# Patient Record
Sex: FEMALE | Race: WHITE | NOT HISPANIC OR LATINO | URBAN - METROPOLITAN AREA
[De-identification: names, ages, dates, MRNs, and addresses within clinical notes are randomized per-mention and may not be internally consistent; named-entity substitution may affect disease eponyms.]

---

## 2017-01-05 ENCOUNTER — INPATIENT (INPATIENT)
Facility: HOSPITAL | Age: 82
LOS: 0 days | Discharge: HOME | DRG: 247 | End: 2017-01-06
Attending: INTERNAL MEDICINE | Admitting: INTERNAL MEDICINE
Payer: COMMERCIAL

## 2017-01-05 VITALS
OXYGEN SATURATION: 95 % | TEMPERATURE: 98 F | RESPIRATION RATE: 18 BRPM | HEART RATE: 58 BPM | SYSTOLIC BLOOD PRESSURE: 178 MMHG | DIASTOLIC BLOOD PRESSURE: 74 MMHG

## 2017-01-05 DIAGNOSIS — Z95.9 PRESENCE OF CARDIAC AND VASCULAR IMPLANT AND GRAFT, UNSPECIFIED: Chronic | ICD-10-CM

## 2017-01-05 DIAGNOSIS — Z98.890 OTHER SPECIFIED POSTPROCEDURAL STATES: Chronic | ICD-10-CM

## 2017-01-05 DIAGNOSIS — R94.39 ABNORMAL RESULT OF OTHER CARDIOVASCULAR FUNCTION STUDY: ICD-10-CM

## 2017-01-05 LAB
BLD GP AB SCN SERPL QL: SIGNIFICANT CHANGE UP
TYPE + AB SCN PNL BLD: SIGNIFICANT CHANGE UP

## 2017-01-05 RX ORDER — CITALOPRAM 10 MG/1
10 TABLET, FILM COATED ORAL DAILY
Qty: 0 | Refills: 0 | Status: DISCONTINUED | OUTPATIENT
Start: 2017-01-05 | End: 2017-01-06

## 2017-01-05 RX ORDER — ASPIRIN/CALCIUM CARB/MAGNESIUM 324 MG
81 TABLET ORAL DAILY
Qty: 0 | Refills: 0 | Status: DISCONTINUED | OUTPATIENT
Start: 2017-01-05 | End: 2017-01-06

## 2017-01-05 RX ORDER — METOPROLOL TARTRATE 50 MG
100 TABLET ORAL DAILY
Qty: 0 | Refills: 0 | Status: DISCONTINUED | OUTPATIENT
Start: 2017-01-05 | End: 2017-01-06

## 2017-01-05 RX ORDER — PANTOPRAZOLE SODIUM 20 MG/1
1 TABLET, DELAYED RELEASE ORAL
Qty: 30 | Refills: 0 | OUTPATIENT
Start: 2017-01-05 | End: 2017-02-04

## 2017-01-05 RX ORDER — SODIUM CHLORIDE 0.65 %
1 AEROSOL, SPRAY (ML) NASAL
Qty: 0 | Refills: 0 | Status: DISCONTINUED | OUTPATIENT
Start: 2017-01-05 | End: 2017-01-06

## 2017-01-05 RX ORDER — LEVOTHYROXINE SODIUM 125 MCG
75 TABLET ORAL DAILY
Qty: 0 | Refills: 0 | Status: DISCONTINUED | OUTPATIENT
Start: 2017-01-05 | End: 2017-01-06

## 2017-01-05 RX ORDER — DRONEDARONE 400 MG/1
400 TABLET, FILM COATED ORAL
Qty: 0 | Refills: 0 | Status: DISCONTINUED | OUTPATIENT
Start: 2017-01-05 | End: 2017-01-06

## 2017-01-05 RX ORDER — SODIUM CHLORIDE 9 MG/ML
1000 INJECTION INTRAMUSCULAR; INTRAVENOUS; SUBCUTANEOUS
Qty: 0 | Refills: 0 | Status: DISCONTINUED | OUTPATIENT
Start: 2017-01-05 | End: 2017-01-06

## 2017-01-05 RX ORDER — DABIGATRAN ETEXILATE MESYLATE 150 MG/1
75 CAPSULE ORAL EVERY 12 HOURS
Qty: 0 | Refills: 0 | Status: DISCONTINUED | OUTPATIENT
Start: 2017-01-06 | End: 2017-01-06

## 2017-01-05 RX ORDER — ATORVASTATIN CALCIUM 80 MG/1
40 TABLET, FILM COATED ORAL AT BEDTIME
Qty: 0 | Refills: 0 | Status: DISCONTINUED | OUTPATIENT
Start: 2017-01-05 | End: 2017-01-06

## 2017-01-05 RX ORDER — CLOPIDOGREL BISULFATE 75 MG/1
75 TABLET, FILM COATED ORAL DAILY
Qty: 0 | Refills: 0 | Status: DISCONTINUED | OUTPATIENT
Start: 2017-01-05 | End: 2017-01-06

## 2017-01-05 RX ORDER — ASPIRIN/CALCIUM CARB/MAGNESIUM 324 MG
325 TABLET ORAL ONCE
Qty: 0 | Refills: 0 | Status: COMPLETED | OUTPATIENT
Start: 2017-01-05 | End: 2017-01-05

## 2017-01-05 RX ORDER — DABIGATRAN ETEXILATE MESYLATE 150 MG/1
75 CAPSULE ORAL EVERY 12 HOURS
Qty: 0 | Refills: 0 | Status: DISCONTINUED | OUTPATIENT
Start: 2017-01-05 | End: 2017-01-05

## 2017-01-05 RX ORDER — DABIGATRAN ETEXILATE MESYLATE 150 MG/1
150 CAPSULE ORAL EVERY 12 HOURS
Qty: 0 | Refills: 0 | Status: DISCONTINUED | OUTPATIENT
Start: 2017-01-05 | End: 2017-01-05

## 2017-01-05 RX ORDER — PANTOPRAZOLE SODIUM 20 MG/1
40 TABLET, DELAYED RELEASE ORAL
Qty: 0 | Refills: 0 | Status: DISCONTINUED | OUTPATIENT
Start: 2017-01-05 | End: 2017-01-06

## 2017-01-05 RX ORDER — LOSARTAN POTASSIUM 100 MG/1
50 TABLET, FILM COATED ORAL DAILY
Qty: 0 | Refills: 0 | Status: DISCONTINUED | OUTPATIENT
Start: 2017-01-05 | End: 2017-01-06

## 2017-01-05 RX ADMIN — ATORVASTATIN CALCIUM 40 MILLIGRAM(S): 80 TABLET, FILM COATED ORAL at 21:47

## 2017-01-05 RX ADMIN — SODIUM CHLORIDE 75 MILLILITER(S): 9 INJECTION INTRAMUSCULAR; INTRAVENOUS; SUBCUTANEOUS at 17:31

## 2017-01-05 RX ADMIN — SODIUM CHLORIDE 75 MILLILITER(S): 9 INJECTION INTRAMUSCULAR; INTRAVENOUS; SUBCUTANEOUS at 13:13

## 2017-01-05 RX ADMIN — Medication 325 MILLIGRAM(S): at 13:13

## 2017-01-05 RX ADMIN — DRONEDARONE 400 MILLIGRAM(S): 400 TABLET, FILM COATED ORAL at 17:34

## 2017-01-05 RX ADMIN — SODIUM CHLORIDE 75 MILLILITER(S): 9 INJECTION INTRAMUSCULAR; INTRAVENOUS; SUBCUTANEOUS at 17:14

## 2017-01-05 NOTE — PROGRESS NOTE ADULT - SUBJECTIVE AND OBJECTIVE BOX
Subjective:  81y  Female who had a left heart catheterization which showed:       LM: Normal       LAD: treated with laser atherectomy and 2 Resolute BRENDA's       LCX: Normal       RCA: Normal    PAST MEDICAL & SURGICAL HISTORY:  Stented coronary artery: 20090a d 2015  Abnormal stress test  Carotid artery disease  PAD (peripheral artery disease)  COPD (chronic obstructive pulmonary disease)  Atrial fibrillation, chronic  CAD, multiple vessel  Hyperlipidemia  Hypertension  S/P angioplasty with stent: 2009, 2015  H/O shoulder surgery: right shoulder  2015    FAMILY HISTORY:    MEDICATIONS  (STANDING):  sodium chloride 0.9%. 1000milliLiter(s) IV Continuous <Continuous>  dronedarone 400milliGRAM(s) Oral two times a day  citalopram 10milliGRAM(s) Oral daily  atorvastatin 40milliGRAM(s) Oral at bedtime  losartan 50milliGRAM(s) Oral daily  hydrochlorothiazide    Capsule 12.5milliGRAM(s) Oral daily  clopidogrel Tablet 75milliGRAM(s) Oral daily  metoprolol succinate ER 100milliGRAM(s) Oral daily  levothyroxine 75MICROGram(s) Oral daily  dabigatran 75milliGRAM(s) Oral every 12 hours  aspirin enteric coated 81milliGRAM(s) Oral daily    MEDICATIONS  (PRN):    Patient is a 81y old  Female who presents with a chief complaint of   HEALTH ISSUES - PROBLEM Dx:        HPI:    General: No fatigue, no fevers/chills  Respiratory: No dyspnea, no cough, no wheeze  CV: No chest pain, no palpitations  Abd: No nausea  Neuro: No headache, no dizziness  No Known Allergies      Objective:  Vital Signs Last 24 Hrs  T(C): 36.7, Max: 36.7 (01-05 @ 12:30)  T(F): 98.1, Max: 98.1 (01-05 @ 12:30)  HR: 55 (54 - 58)  BP: 164/72 (158/70 - 183/79)  RR: 18 (18 - 18)  SpO2: 98% (94% - 98%)  CM: SR  Neuro: A&OX3, CN 2-12 intact  HEENT: NC, AT  Lungs: CTA B/L  CV: S1, S2, no murmur, RRR  Abd: Soft  Right Groin: Soft, no bleeding, no hematoma  Extremity: + distal pulses  EKG: SB Subjective:  81y  Female who had a left heart catheterization which showed:       LM: Mild luminal irregularities with no flow limiting disturbances.       LAD: 85% proximal ISR (3.6 sq. mm by IVUS) treated with laser atherectomy and 2 Resolute BRENDA's       LCX: 10% proximal and distal ISR       RCA: Mild luminal irregularities with no flow limiting disturbances.    PAST MEDICAL & SURGICAL HISTORY:  Stented coronary artery: 20090a d 2015  Abnormal stress test  Carotid artery disease  PAD (peripheral artery disease)  COPD (chronic obstructive pulmonary disease)  Atrial fibrillation, chronic  CAD, multiple vessel  Hyperlipidemia  Hypertension  S/P angioplasty with stent: 2009, 2015  H/O shoulder surgery: right shoulder  2015    FAMILY HISTORY:    MEDICATIONS  (STANDING):  sodium chloride 0.9%. 1000milliLiter(s) IV Continuous <Continuous>  dronedarone 400milliGRAM(s) Oral two times a day  citalopram 10milliGRAM(s) Oral daily  atorvastatin 40milliGRAM(s) Oral at bedtime  losartan 50milliGRAM(s) Oral daily  hydrochlorothiazide    Capsule 12.5milliGRAM(s) Oral daily  clopidogrel Tablet 75milliGRAM(s) Oral daily  metoprolol succinate ER 100milliGRAM(s) Oral daily  levothyroxine 75MICROGram(s) Oral daily  dabigatran 75milliGRAM(s) Oral every 12 hours  aspirin enteric coated 81milliGRAM(s) Oral daily    MEDICATIONS  (PRN):    Patient is a 81y old  Female who presents with a chief complaint of   HEALTH ISSUES - PROBLEM Dx:        HPI:    General: No fatigue, no fevers/chills  Respiratory: No dyspnea, no cough, no wheeze  CV: No chest pain, no palpitations  Abd: No nausea  Neuro: No headache, no dizziness  No Known Allergies      Objective:  Vital Signs Last 24 Hrs  T(C): 36.7, Max: 36.7 (01-05 @ 12:30)  T(F): 98.1, Max: 98.1 (01-05 @ 12:30)  HR: 55 (54 - 58)  BP: 164/72 (158/70 - 183/79)  RR: 18 (18 - 18)  SpO2: 98% (94% - 98%)  CM: SR  Neuro: A&OX3, CN 2-12 intact  HEENT: NC, AT  Lungs: CTA B/L  CV: S1, S2, no murmur, RRR  Abd: Soft  Right Groin: Soft, no bleeding, no hematoma  Extremity: + distal pulses  EKG: SB

## 2017-01-05 NOTE — PROGRESS NOTE ADULT - ASSESSMENT
81y Female  Procedure: Left heart catheterization and PCI with BRENDA of the pLAD  Pre-op diagnosis: CCS class 2 angina  Post-op diagnosis: CAD of native LAD of native heart with stable angina    1. Remove right femoral artery sheath at:     2. Bedrest for:     3. Admit to:     4. Follow up as an outpatient with     5. DAPT: Plavix 75mg daily and Aspirin 81mg daily    6. Statin:     7. CBC, BMP, Mg, EKG in AM    8. Continue current medications

## 2017-01-06 VITALS
DIASTOLIC BLOOD PRESSURE: 63 MMHG | TEMPERATURE: 99 F | OXYGEN SATURATION: 95 % | SYSTOLIC BLOOD PRESSURE: 116 MMHG | RESPIRATION RATE: 16 BRPM | HEART RATE: 104 BPM

## 2017-01-06 LAB
ANION GAP SERPL CALC-SCNC: 13 MMOL/L — SIGNIFICANT CHANGE UP (ref 5–17)
ANISOCYTOSIS BLD QL: SLIGHT — SIGNIFICANT CHANGE UP
BUN SERPL-MCNC: 14 MG/DL — SIGNIFICANT CHANGE UP (ref 8–20)
CALCIUM SERPL-MCNC: 9.9 MG/DL — SIGNIFICANT CHANGE UP (ref 8.6–10.2)
CHLORIDE SERPL-SCNC: 97 MMOL/L — LOW (ref 98–107)
CHOLEST SERPL-MCNC: 157 MG/DL — SIGNIFICANT CHANGE UP (ref 110–199)
CO2 SERPL-SCNC: 27 MMOL/L — SIGNIFICANT CHANGE UP (ref 22–29)
CREAT SERPL-MCNC: 0.75 MG/DL — SIGNIFICANT CHANGE UP (ref 0.5–1.3)
EOSINOPHIL NFR BLD AUTO: 1 % — SIGNIFICANT CHANGE UP (ref 0–5)
GLUCOSE SERPL-MCNC: 113 MG/DL — SIGNIFICANT CHANGE UP (ref 70–115)
HCT VFR BLD CALC: 41.2 % — SIGNIFICANT CHANGE UP (ref 37–47)
HDLC SERPL-MCNC: 64 MG/DL — LOW
HGB BLD-MCNC: 13.5 G/DL — SIGNIFICANT CHANGE UP (ref 12–16)
LIPID PNL WITH DIRECT LDL SERPL: 72 MG/DL — SIGNIFICANT CHANGE UP
LYMPHOCYTES # BLD AUTO: 28 % — SIGNIFICANT CHANGE UP (ref 20–55)
MAGNESIUM SERPL-MCNC: 1.9 MG/DL — SIGNIFICANT CHANGE UP (ref 1.8–2.5)
MCHC RBC-ENTMCNC: 29.8 PG — SIGNIFICANT CHANGE UP (ref 27–31)
MCHC RBC-ENTMCNC: 32.8 G/DL — SIGNIFICANT CHANGE UP (ref 32–36)
MCV RBC AUTO: 90.9 FL — SIGNIFICANT CHANGE UP (ref 81–99)
MONOCYTES NFR BLD AUTO: 15 % — HIGH (ref 3–10)
NEUTROPHILS NFR BLD AUTO: 54 % — SIGNIFICANT CHANGE UP (ref 37–73)
OVALOCYTES BLD QL SMEAR: SLIGHT — SIGNIFICANT CHANGE UP
PLAT MORPH BLD: NORMAL — SIGNIFICANT CHANGE UP
PLATELET # BLD AUTO: 180 K/UL — SIGNIFICANT CHANGE UP (ref 150–400)
POIKILOCYTOSIS BLD QL AUTO: SLIGHT — SIGNIFICANT CHANGE UP
POTASSIUM SERPL-MCNC: 4.5 MMOL/L — SIGNIFICANT CHANGE UP (ref 3.5–5.3)
POTASSIUM SERPL-SCNC: 4.5 MMOL/L — SIGNIFICANT CHANGE UP (ref 3.5–5.3)
RBC # BLD: 4.53 M/UL — SIGNIFICANT CHANGE UP (ref 4.4–5.2)
RBC # FLD: 13.5 % — SIGNIFICANT CHANGE UP (ref 11–15.6)
RBC BLD AUTO: SIGNIFICANT CHANGE UP
SODIUM SERPL-SCNC: 137 MMOL/L — SIGNIFICANT CHANGE UP (ref 135–145)
TOTAL CHOLESTEROL/HDL RATIO MEASUREMENT: 2 RATIO — LOW (ref 3.3–7.1)
TRIGL SERPL-MCNC: 107 MG/DL — SIGNIFICANT CHANGE UP (ref 10–200)
VARIANT LYMPHS # BLD: 2 % — SIGNIFICANT CHANGE UP (ref 0–6)
WBC # BLD: 9.44 K/UL — SIGNIFICANT CHANGE UP (ref 4.8–10.8)
WBC # FLD AUTO: 9.44 K/UL — SIGNIFICANT CHANGE UP (ref 4.8–10.8)

## 2017-01-06 PROCEDURE — 36415 COLL VENOUS BLD VENIPUNCTURE: CPT

## 2017-01-06 PROCEDURE — 86901 BLOOD TYPING SEROLOGIC RH(D): CPT

## 2017-01-06 PROCEDURE — 86850 RBC ANTIBODY SCREEN: CPT

## 2017-01-06 PROCEDURE — 80048 BASIC METABOLIC PNL TOTAL CA: CPT

## 2017-01-06 PROCEDURE — C1885: CPT

## 2017-01-06 PROCEDURE — 93458 L HRT ARTERY/VENTRICLE ANGIO: CPT | Mod: XU

## 2017-01-06 PROCEDURE — C1887: CPT

## 2017-01-06 PROCEDURE — C1753: CPT

## 2017-01-06 PROCEDURE — C1874: CPT

## 2017-01-06 PROCEDURE — 92978 ENDOLUMINL IVUS OCT C 1ST: CPT | Mod: LD

## 2017-01-06 PROCEDURE — C1725: CPT

## 2017-01-06 PROCEDURE — C1760: CPT

## 2017-01-06 PROCEDURE — 85027 COMPLETE CBC AUTOMATED: CPT

## 2017-01-06 PROCEDURE — 93005 ELECTROCARDIOGRAM TRACING: CPT

## 2017-01-06 PROCEDURE — 86900 BLOOD TYPING SEROLOGIC ABO: CPT

## 2017-01-06 PROCEDURE — C1894: CPT

## 2017-01-06 PROCEDURE — 93010 ELECTROCARDIOGRAM REPORT: CPT

## 2017-01-06 PROCEDURE — C1769: CPT

## 2017-01-06 PROCEDURE — 83735 ASSAY OF MAGNESIUM: CPT

## 2017-01-06 PROCEDURE — C9602: CPT | Mod: LD

## 2017-01-06 PROCEDURE — 80061 LIPID PANEL: CPT

## 2017-01-06 RX ORDER — LOSARTAN POTASSIUM 100 MG/1
1 TABLET, FILM COATED ORAL
Qty: 0 | Refills: 0 | DISCHARGE
Start: 2017-01-06

## 2017-01-06 RX ORDER — ASPIRIN/CALCIUM CARB/MAGNESIUM 324 MG
1 TABLET ORAL
Qty: 90 | Refills: 0
Start: 2017-01-06

## 2017-01-06 RX ORDER — CLOPIDOGREL BISULFATE 75 MG/1
1 TABLET, FILM COATED ORAL
Qty: 90 | Refills: 0
Start: 2017-01-06

## 2017-01-06 RX ORDER — ATORVASTATIN CALCIUM 80 MG/1
1 TABLET, FILM COATED ORAL
Qty: 90 | Refills: 0
Start: 2017-01-06

## 2017-01-06 RX ORDER — PANTOPRAZOLE SODIUM 20 MG/1
1 TABLET, DELAYED RELEASE ORAL
Qty: 30 | Refills: 0
Start: 2017-01-06 | End: 2017-02-05

## 2017-01-06 RX ADMIN — DRONEDARONE 400 MILLIGRAM(S): 400 TABLET, FILM COATED ORAL at 06:35

## 2017-01-06 RX ADMIN — Medication 75 MICROGRAM(S): at 06:35

## 2017-01-06 RX ADMIN — PANTOPRAZOLE SODIUM 40 MILLIGRAM(S): 20 TABLET, DELAYED RELEASE ORAL at 06:36

## 2017-01-06 RX ADMIN — CLOPIDOGREL BISULFATE 75 MILLIGRAM(S): 75 TABLET, FILM COATED ORAL at 11:32

## 2017-01-06 RX ADMIN — Medication 81 MILLIGRAM(S): at 11:33

## 2017-01-06 RX ADMIN — CITALOPRAM 10 MILLIGRAM(S): 10 TABLET, FILM COATED ORAL at 11:33

## 2017-01-06 RX ADMIN — DABIGATRAN ETEXILATE MESYLATE 75 MILLIGRAM(S): 150 CAPSULE ORAL at 06:35

## 2017-01-06 RX ADMIN — LOSARTAN POTASSIUM 50 MILLIGRAM(S): 100 TABLET, FILM COATED ORAL at 06:35

## 2017-01-06 RX ADMIN — Medication 100 MILLIGRAM(S): at 06:35

## 2017-01-06 NOTE — DISCHARGE NOTE ADULT - CARE PROVIDER_API CALL
Alfonso Herrera), Cardiovascular Disease; Interventional Cardiology  [address] [place] [address]  Phone: [phone]  Fax: [phone]

## 2017-01-06 NOTE — DISCHARGE NOTE ADULT - CARE PLAN
Principal Discharge DX:	CAD, multiple vessel  Goal:	maintain optimum heart function  Instructions for follow-up, activity and diet:	as tolerated

## 2017-01-06 NOTE — DISCHARGE NOTE ADULT - MEDICATION SUMMARY - MEDICATIONS TO TAKE
I will START or STAY ON the medications listed below when I get home from the hospital:    aspirin 81 mg oral delayed release tablet  -- 1 tab(s) by mouth once a day  -- Indication: For cad    losartan 50 mg oral tablet  -- 1 tab(s) by mouth once a day  -- Indication: For htn    Multaq 400 mg oral tablet  -- 1 tab(s) by mouth 2 times a day  -- Indication: For anti arrythmic    Pradaxa 75 mg oral capsule  -- 1 cap(s) by mouth 2 times a day  -- Indication: For cad    citalopram 10 mg oral tablet  -- 1 tab(s) by mouth once a day  -- Indication: For depression    atorvastatin 40 mg oral tablet  -- 1 tab(s) by mouth once a day (at bedtime)  -- Indication: For hyperlipidemia    hydrochlorothiazide-losartan 12.5 mg-50 mg oral tablet  -- 1 tab(s) by mouth once a day  -- Indication: For htn    clopidogrel 75 mg oral tablet  -- 1 tab(s) by mouth once a day  -- Indication: For cad    Metoprolol Succinate  mg oral tablet, extended release  -- 1 tab(s) by mouth once a day  -- Indication: For htn    hydrochlorothiazide 25 mg oral tablet  -- 1 tab(s) by mouth once a day  -- Indication: For htn    pantoprazole 40 mg oral delayed release tablet  -- 1 tab(s) by mouth once a day (before a meal)  -- Indication: For gerd    levothyroxine 75 mcg (0.075 mg) oral capsule  -- 1 cap(s) by mouth once a day  -- Indication: For hypothyroidism

## 2017-01-06 NOTE — DISCHARGE NOTE ADULT - MEDICATION SUMMARY - MEDICATIONS TO STOP TAKING
I will STOP taking the medications listed below when I get home from the hospital:    Crestor 10 mg oral tablet  -- 1 tab(s) by mouth once a day (at bedtime)

## 2017-01-06 NOTE — DISCHARGE NOTE ADULT - HOSPITAL COURSE
Subjective:  81y  Female who had a left heart catheterization which showed:       LM: Normal       LAD: treated with laser atherectomy and 2 Resolute BRENDA's       LCX: Normal       RCA: Normal    PAST MEDICAL & SURGICAL HISTORY:  Stented coronary artery: 20090a d 2015  Abnormal stress test  Carotid artery disease  PAD (peripheral artery disease)  COPD (chronic obstructive pulmonary disease)  Atrial fibrillation, chronic  CAD, multiple vessel  Hyperlipidemia  Hypertension  S/P angioplasty with stent: 2009, 2015  H/O shoulder surgery: right shoulder  2015    FAMILY HISTORY:    MEDICATIONS  (STANDING):  sodium chloride 0.9%. 1000milliLiter(s) IV Continuous <Continuous>  dronedarone 400milliGRAM(s) Oral two times a day  citalopram 10milliGRAM(s) Oral daily  atorvastatin 40milliGRAM(s) Oral at bedtime  losartan 50milliGRAM(s) Oral daily  hydrochlorothiazide    Capsule 12.5milliGRAM(s) Oral daily  clopidogrel Tablet 75milliGRAM(s) Oral daily  metoprolol succinate ER 100milliGRAM(s) Oral daily  levothyroxine 75MICROGram(s) Oral daily  dabigatran 75milliGRAM(s) Oral every 12 hours  aspirin enteric coated 81milliGRAM(s) Oral daily    MEDICATIONS  (PRN):    Patient is a 81y old  Female who presents with a chief complaint of   HEALTH ISSUES - PROBLEM Dx:        HPI:    General: No fatigue, no fevers/chills  Respiratory: No dyspnea, no cough, no wheeze  CV: No chest pain, no palpitations  Abd: No nausea  Neuro: No headache, no dizziness  No Known Allergies      Objective:  Vital Signs Last 24 Hrs  T(C): 36.7, Max: 36.7 (01-05 @ 12:30)  T(F): 98.1, Max: 98.1 (01-05 @ 12:30)  HR: 55 (54 - 58)  BP: 164/72 (158/70 - 183/79)  RR: 18 (18 - 18)  SpO2: 98% (94% - 98%)  CM: SR  Neuro: A&OX3, CN 2-12 intact  HEENT: NC, AT  Lungs: CTA B/L  CV: S1, S2, no murmur, RRR  Abd: Soft  Right Groin: Soft, no bleeding, no hematoma  Extremity: + distal pulses  EKG: SB    Assessment and Plan:   · Assessment		  81y Female  Procedure: Left heart catheterization and PCI with BRENDA of the pLAD  Pre-op diagnosis: CCS class 2 angina  Post-op diagnosis: CAD of native LAD of native heart with stable angina       Follow up as an outpatient with MD Herrera 1 week.      DAPT:  Add Plavix 75mg daily and Aspirin 81mg daily    Statin: Lipitor 40mg po qhs     Aggressive life style modifications reviewed and explained in detail.      Continue current medications including Pradaxa    D.C planning as per MD Herrera.

## 2017-01-06 NOTE — DISCHARGE NOTE ADULT - PATIENT PORTAL LINK FT
“You can access the [facility] Patient Portal, offered by [facility], by registering with the following website: [email or web address]”

## 2017-01-06 NOTE — PROGRESS NOTE ADULT - SUBJECTIVE AND OBJECTIVE BOX
[place] Complaint:  Increasing ESPINOZA.      HPI:  		  81 year old female with hx of CAD s/p PCI and stents in  and , HTN, HL, hypothyroidism, atrial fibrillation on pradaxa and COPD presents for cardiac cath due to abnormal routine stress test which shows a moderate zone of anterior ischemia.  Patient and daughter report increased fatigue and dyspnea on exertion.      Primary MD Willis  Cardiology MD Herrera      Other nonspecific abnormal cardiovascular system function study  Handoff  Stented coronary artery  Abnormal stress test  Carotid artery disease  PAD (peripheral artery disease)  COPD (chronic obstructive pulmonary disease)  Atrial fibrillation, chronic  CAD, multiple vessel  Hyperlipidemia  Hypertension  S/P angioplasty with stent  H/O shoulder surgery      REVIEW OF SYSTEMS    General: Denies fever, chills, pain, no discomfort  	  Respiratory and Thorax:  Denies cough, sob, or any discomfort  	  Cardiovascular:  Denies chest pain, palpitations or any discomfort	    Gastrointestinal:  Denies n/v/d, constipation, or any discomfort    Genitourinary:  Denies frequency, burning, or pain    Musculoskeletal:  Denies joint pain, swelling, or any discomfort     Neurological:  Denies headache, dizziness blurred vision, numbing or tingling    Psychiatric:  Denies sadness or depression    Hematology  Cl bleeding o swelling    Allergic/Immunologic:	  MEDICATIONS:  dronedarone 400milliGRAM(s) Oral two times a day  losartan 50milliGRAM(s) Oral daily  hydrochlorothiazide    Capsule 12.5milliGRAM(s) Oral daily  metoprolol succinate ER 100milliGRAM(s) Oral daily  diltiazem IVPB 10milliGRAM(s) IV Intermittent once  citalopram 10milliGRAM(s) Oral daily  pantoprazole    Tablet 40milliGRAM(s) Oral before breakfast  atorvastatin 40milliGRAM(s) Oral at bedtime  levothyroxine 75MICROGram(s) Oral daily  dabigatran 75milliGRAM(s) Oral every 12 hours  clopidogrel Tablet 75milliGRAM(s) Oral daily  aspirin enteric coated 81milliGRAM(s) Oral daily  sodium chloride 0.65% Nasal 1Spray(s) Both Nostrils every 2 hours PRN        PHYSICAL EXAM:    T(C): 37.4, Max: 37.4 (01-06 @ 07:49)  HR: 141 (53 - 141)  BP: 135/79 (120/70 - 183/79)  RR: 18 (16 - 18)  SpO2: 94% (94% - 99%)    Appearance: Normal, 	  HEENT:   Normal oral mucosa, PERRL, EOMI	  Lymphatic: No lymphadenopathy  Cardiovascular: Normal S1 S2, No JVD, No murmurs, No edema  Respiratory: Lungs clear to auscultation	  Psychiatry: A & O x 3, Mood & affect appropriate  Gastrointestinal:  Soft, Non-tender, + BS	  Skin: No rashes, No ecchymoses, No cyanosis  Neurologic: Non-focal deficits noted.    Extremities: Normal range of motion, No clubbing, cyanosis or edema  Vascular: Peripheral pulses palpable 2+ bilaterally  Procedure Site:      TELEMETRY: 	    EC.5   9.44  )-----------( 180      ( 2017 05:04 )             41.2     2017 05:04    137    |  97     |  14.0   ----------------------------<  113    4.5     |  27.0   |  0.75     Ca    9.9        2017 05:04  Mg     1.9       2017 05:04 [place] Complaint:  Increasing ESPINOZA.      HPI:  		  81 year old female with hx of CAD s/p PCI and stents in  and , HTN, HL, hypothyroidism, atrial fibrillation on pradaxa and COPD presents for cardiac cath due to abnormal routine stress test which shows a moderate zone of anterior ischemia.  Patient and daughter report increased fatigue and dyspnea on exertion.  Underwent cardiac cath yesterday.    · Subjective and Objective: 	  Subjective:  81y  Female who had a left heart catheterization which showed:       LM: Mild luminal irregularities with no flow limiting disturbances.       LAD: 85% proximal ISR (3.6 sq. mm by IVUS) treated with laser atherectomy and 2 Resolute BRENDA's       LCX: 10% proximal and distal ISR       RCA: Mild luminal irregularities with no flow limiting disturbances.       Primary MD Willis  Cardiology MD Herrera      Other nonspecific abnormal cardiovascular system function study  Handoff  Stented coronary artery  Abnormal stress test  Carotid artery disease  PAD (peripheral artery disease)  COPD (chronic obstructive pulmonary disease)  Atrial fibrillation, chronic  CAD, multiple vessel  Hyperlipidemia  Hypertension  S/P angioplasty with stent  H/O shoulder surgery      REVIEW OF SYSTEMS    General: Denies fever, chills, pain, no discomfort  	  Respiratory and Thorax:  Denies cough, sob, or any discomfort  	  Cardiovascular:  Denies chest pain, palpitations or any discomfort	    Gastrointestinal:  Denies n/v/d, constipation, or any discomfort    Genitourinary:  Denies frequency, burning, or pain    Musculoskeletal:  Denies joint pain, swelling, or any discomfort     Neurological:  Denies headache, dizziness blurred vision, numbing or tingling    Hematology  Cl bleeding o swelling    Allergic/Immunologic:	  MEDICATIONS:  dronedarone 400milliGRAM(s) Oral two times a day  losartan 50milliGRAM(s) Oral daily  hydrochlorothiazide    Capsule 12.5milliGRAM(s) Oral daily  metoprolol succinate ER 100milliGRAM(s) Oral daily  diltiazem IVPB 10milliGRAM(s) IV Intermittent once  citalopram 10milliGRAM(s) Oral daily  pantoprazole    Tablet 40milliGRAM(s) Oral before breakfast  atorvastatin 40milliGRAM(s) Oral at bedtime  levothyroxine 75MICROGram(s) Oral daily  dabigatran 75milliGRAM(s) Oral every 12 hours  clopidogrel Tablet 75milliGRAM(s) Oral daily  aspirin enteric coated 81milliGRAM(s) Oral daily  sodium chloride 0.65% Nasal 1Spray(s) Both Nostrils every 2 hours PRN        PHYSICAL EXAM:    T(C): 37.4, Max: 37.4 (-06 @ 07:49)  HR: 141 (53 - 141), given cardizem and rate decreased to 100's, spoke with MD Herrera and plan to discharge after lunch.    BP: 135/79 (120/70 - 183/79)  RR: 18 (16 - 18)  SpO2: 94% (94% - 99%)    Appearance: Normal, 	  HEENT:   Normal oral mucosa, PERRL, EOMI	  Lymphatic: No lymphadenopathy  Cardiovascular: Normal S1 S2, No JVD, No murmurs, No edema  Respiratory: Lungs clear to auscultation	  Psychiatry: A & O x 3, Mood & affect appropriate  Gastrointestinal:  Soft, Non-tender, + BS	  Skin: No rashes, No ecchymoses, No cyanosis  Neurologic: Non-focal deficits noted.    Extremities: Normal range of motion, No clubbing, cyanosis or edema  Vascular: Peripheral pulses palpable 2+ bilaterally  Procedure Site:  Right groin, no bleeding, no bruising no pain, no hematoma noted, +pp no edema.        TELEMETRY: 	    EC.5   9.44  )-----------( 180      ( 2017 05:04 )             41.2     2017 05:04    137    |  97     |  14.0   ----------------------------<  113    4.5     |  27.0   |  0.75     Ca    9.9        2017 05:04  Mg     1.9       2017 05:04      A/P:   S/P LHC:  BRENDA x 1 to proximal LAD    Subjective:  81y  Female who had a left heart catheterization which showed:       LM: Normal       LAD: treated with laser atherectomy and 2 Resolute BRENDA's       LCX: Normal       RCA: Normal    PAST MEDICAL & SURGICAL HISTORY:  Stented coronary artery:  d   Abnormal stress test  Carotid artery disease  PAD (peripheral artery disease)  COPD (chronic obstructive pulmonary disease)  Atrial fibrillation, chronic  CAD, multiple vessel  Hyperlipidemia  Hypertension  S/P angioplasty with stent: ,   H/O shoulder surgery: right shoulder      FAMILY HISTORY:    MEDICATIONS  (STANDING):  sodium chloride 0.9%. 1000milliLiter(s) IV Continuous <Continuous>  dronedarone 400milliGRAM(s) Oral two times a day  citalopram 10milliGRAM(s) Oral daily  atorvastatin 40milliGRAM(s) Oral at bedtime  losartan 50milliGRAM(s) Oral daily  hydrochlorothiazide    Capsule 12.5milliGRAM(s) Oral daily  clopidogrel Tablet 75milliGRAM(s) Oral daily  metoprolol succinate ER 100milliGRAM(s) Oral daily  levothyroxine 75MICROGram(s) Oral daily  dabigatran 75milliGRAM(s) Oral every 12 hours  aspirin enteric coated 81milliGRAM(s) Oral daily    MEDICATIONS  (PRN):    Patient is a 81y old  Female who presents with a chief complaint of   HEALTH ISSUES - PROBLEM Dx:        HPI:    General: No fatigue, no fevers/chills  Respiratory: No dyspnea, no cough, no wheeze  CV: No chest pain, no palpitations  Abd: No nausea  Neuro: No headache, no dizziness  No Known Allergies      Objective:  Vital Signs Last 24 Hrs  T(C): 36.7, Max: 36.7 ( @ 12:30)  T(F): 98.1, Max: 98.1 ( @ 12:30)  HR: 55 (54 - 58)  BP: 164/72 (158/70 - 183/79)  RR: 18 (18 - 18)  SpO2: 98% (94% - 98%)  CM: SR  Neuro: A&OX3, CN 2-12 intact  HEENT: NC, AT  Lungs: CTA B/L  CV: S1, S2, no murmur, RRR  Abd: Soft  Right Groin: Soft, no bleeding, no hematoma  Extremity: + distal pulses  EKG: SB    Assessment and Plan:   · Assessment		  81y Female  Procedure: Left heart catheterization and PCI with BRENDA of the pLAD  Pre-op diagnosis: CCS class 2 angina  Post-op diagnosis: CAD of native LAD of native heart with stable angina       Follow up as an outpatient with MD Herrera 1 week.      DAPT:  Add Plavix 75mg daily and Aspirin 81mg daily    Statin: Lipitor 40mg po qhs    Aggressive life style modifications reviewed and explained in detail.      Continue current medications including Pradaxa    D.C planning as per MD Herrera.

## 2017-01-06 NOTE — DISCHARGE NOTE ADULT - ADDITIONAL INSTRUCTIONS
No heavy lifting, driving, sex, tub baths, swimming, or any activity that submerges the lower half of the body in water for 48 hours.  Limited walking and stairs for 48 hours.    Change the bandaid after 24 hours and every 24 hours after that.  Keep the puncture site dry and covered with a bandaid until a scab forms.    Observe the site frequently.  If bleeding or a large lump (the size of a golf ball or bigger) occurs lie flat, apply continuous direct pressure just above the puncture site for at least 10 minutes, and notify your physician immediately.  If the bleeding cannot be controlled, call 911 immediately for assistance.  Notify your physician of pain, swelling or any drainage.    Notify your physician immediately if coldness, numbness, discoloration or pain in your foot occurs.  Restricted use with no heavy lifting of affected arm for 48 hours.  No submerging the arm in water for 48 hours.  You may start showering today.  Call your doctor for any bleeding, swelling, loss of sensation in the hand or fingers, or fingers turning blue.  If heavy bleeding or large lumps form, hold pressure at the spot and come to the Emergency Room.

## 2017-09-21 ENCOUNTER — TRANSCRIPTION ENCOUNTER (OUTPATIENT)
Age: 82
End: 2017-09-21

## 2017-12-22 ENCOUNTER — TRANSCRIPTION ENCOUNTER (OUTPATIENT)
Age: 82
End: 2017-12-22

## 2018-04-06 NOTE — DISCHARGE NOTE ADULT - NS MD DC FALL RISK RISK
For information on Fall & Injury Prevention, visit [email or web address]
None

## 2019-06-27 ENCOUNTER — OUTPATIENT (OUTPATIENT)
Dept: OUTPATIENT SERVICES | Facility: HOSPITAL | Age: 84
LOS: 1 days | End: 2019-06-27
Payer: MEDICARE

## 2019-06-27 VITALS
DIASTOLIC BLOOD PRESSURE: 67 MMHG | HEIGHT: 62 IN | RESPIRATION RATE: 18 BRPM | OXYGEN SATURATION: 92 % | SYSTOLIC BLOOD PRESSURE: 144 MMHG | HEART RATE: 62 BPM | WEIGHT: 201.94 LBS | TEMPERATURE: 98 F

## 2019-06-27 DIAGNOSIS — Z98.890 OTHER SPECIFIED POSTPROCEDURAL STATES: Chronic | ICD-10-CM

## 2019-06-27 DIAGNOSIS — R94.39 ABNORMAL RESULT OF OTHER CARDIOVASCULAR FUNCTION STUDY: ICD-10-CM

## 2019-06-27 DIAGNOSIS — Z01.810 ENCOUNTER FOR PREPROCEDURAL CARDIOVASCULAR EXAMINATION: ICD-10-CM

## 2019-06-27 DIAGNOSIS — Z95.9 PRESENCE OF CARDIAC AND VASCULAR IMPLANT AND GRAFT, UNSPECIFIED: Chronic | ICD-10-CM

## 2019-06-27 LAB
ALBUMIN SERPL ELPH-MCNC: 4.5 G/DL — SIGNIFICANT CHANGE UP (ref 3.3–5.2)
ALP SERPL-CCNC: 68 U/L — SIGNIFICANT CHANGE UP (ref 40–120)
ALT FLD-CCNC: 22 U/L — SIGNIFICANT CHANGE UP
ANION GAP SERPL CALC-SCNC: 15 MMOL/L — SIGNIFICANT CHANGE UP (ref 5–17)
APTT BLD: 38.2 SEC — HIGH (ref 27.5–36.3)
AST SERPL-CCNC: 33 U/L — HIGH
BASOPHILS # BLD AUTO: 0.03 K/UL — SIGNIFICANT CHANGE UP (ref 0–0.2)
BASOPHILS NFR BLD AUTO: 0.3 % — SIGNIFICANT CHANGE UP (ref 0–2)
BILIRUB SERPL-MCNC: 0.6 MG/DL — SIGNIFICANT CHANGE UP (ref 0.4–2)
BUN SERPL-MCNC: 20 MG/DL — SIGNIFICANT CHANGE UP (ref 8–20)
CALCIUM SERPL-MCNC: 10.3 MG/DL — HIGH (ref 8.6–10.2)
CHLORIDE SERPL-SCNC: 99 MMOL/L — SIGNIFICANT CHANGE UP (ref 98–107)
CO2 SERPL-SCNC: 24 MMOL/L — SIGNIFICANT CHANGE UP (ref 22–29)
CREAT SERPL-MCNC: 1.02 MG/DL — SIGNIFICANT CHANGE UP (ref 0.5–1.3)
EOSINOPHIL # BLD AUTO: 0.03 K/UL — SIGNIFICANT CHANGE UP (ref 0–0.5)
EOSINOPHIL NFR BLD AUTO: 0.3 % — SIGNIFICANT CHANGE UP (ref 0–6)
GLUCOSE SERPL-MCNC: 138 MG/DL — HIGH (ref 70–115)
HCT VFR BLD CALC: 45.3 % — HIGH (ref 34.5–45)
HGB BLD-MCNC: 14.9 G/DL — SIGNIFICANT CHANGE UP (ref 11.5–15.5)
IMM GRANULOCYTES NFR BLD AUTO: 0.5 % — SIGNIFICANT CHANGE UP (ref 0–1.5)
INR BLD: 1.18 RATIO — HIGH (ref 0.88–1.16)
LYMPHOCYTES # BLD AUTO: 2.76 K/UL — SIGNIFICANT CHANGE UP (ref 1–3.3)
LYMPHOCYTES # BLD AUTO: 31.5 % — SIGNIFICANT CHANGE UP (ref 13–44)
MCHC RBC-ENTMCNC: 29.6 PG — SIGNIFICANT CHANGE UP (ref 27–34)
MCHC RBC-ENTMCNC: 32.9 GM/DL — SIGNIFICANT CHANGE UP (ref 32–36)
MCV RBC AUTO: 89.9 FL — SIGNIFICANT CHANGE UP (ref 80–100)
MONOCYTES # BLD AUTO: 0.98 K/UL — HIGH (ref 0–0.9)
MONOCYTES NFR BLD AUTO: 11.2 % — SIGNIFICANT CHANGE UP (ref 2–14)
NEUTROPHILS # BLD AUTO: 4.92 K/UL — SIGNIFICANT CHANGE UP (ref 1.8–7.4)
NEUTROPHILS NFR BLD AUTO: 56.2 % — SIGNIFICANT CHANGE UP (ref 43–77)
PLATELET # BLD AUTO: 205 K/UL — SIGNIFICANT CHANGE UP (ref 150–400)
POTASSIUM SERPL-MCNC: 3.9 MMOL/L — SIGNIFICANT CHANGE UP (ref 3.5–5.3)
POTASSIUM SERPL-SCNC: 3.9 MMOL/L — SIGNIFICANT CHANGE UP (ref 3.5–5.3)
PROT SERPL-MCNC: 7.7 G/DL — SIGNIFICANT CHANGE UP (ref 6.6–8.7)
PROTHROM AB SERPL-ACNC: 13.6 SEC — HIGH (ref 10–12.9)
RBC # BLD: 5.04 M/UL — SIGNIFICANT CHANGE UP (ref 3.8–5.2)
RBC # FLD: 12.7 % — SIGNIFICANT CHANGE UP (ref 10.3–14.5)
SODIUM SERPL-SCNC: 138 MMOL/L — SIGNIFICANT CHANGE UP (ref 135–145)
WBC # BLD: 8.76 K/UL — SIGNIFICANT CHANGE UP (ref 3.8–10.5)
WBC # FLD AUTO: 8.76 K/UL — SIGNIFICANT CHANGE UP (ref 3.8–10.5)

## 2019-06-27 PROCEDURE — 93010 ELECTROCARDIOGRAM REPORT: CPT

## 2019-06-27 PROCEDURE — 80053 COMPREHEN METABOLIC PANEL: CPT

## 2019-06-27 PROCEDURE — 36415 COLL VENOUS BLD VENIPUNCTURE: CPT

## 2019-06-27 PROCEDURE — 85610 PROTHROMBIN TIME: CPT

## 2019-06-27 PROCEDURE — 85027 COMPLETE CBC AUTOMATED: CPT

## 2019-06-27 PROCEDURE — G0463: CPT

## 2019-06-27 PROCEDURE — 85730 THROMBOPLASTIN TIME PARTIAL: CPT

## 2019-06-27 PROCEDURE — 93005 ELECTROCARDIOGRAM TRACING: CPT

## 2019-06-27 NOTE — H&P PST ADULT - LAST ECHOCARDIOGRAM
4/29/19 mild to moderate MR, mild to moderate TR, moderate to severe TR, moderate to severe pulmonary hypertension

## 2019-06-27 NOTE — H&P PST ADULT - LAST STRESS TEST
6/6/19 small amount of mild to moderate ischemia involving the mid to distal anterolateral wall EF 47%

## 2019-06-27 NOTE — H&P PST ADULT - NSICDXPASTSURGICALHX_GEN_ALL_CORE_FT
PAST SURGICAL HISTORY:  H/O shoulder surgery right shoulder  2015    S/P angioplasty with stent 2009, 2015

## 2019-06-27 NOTE — H&P PST ADULT - NSICDXPASTMEDICALHX_GEN_ALL_CORE_FT
PAST MEDICAL HISTORY:  Abnormal stress test     Atrial fibrillation, chronic     CAD, multiple vessel     Carotid artery disease     COPD (chronic obstructive pulmonary disease)     Hyperlipidemia     Hypertension     PAD (peripheral artery disease)     Stented coronary artery 20090a d 2015

## 2019-06-27 NOTE — H&P PST ADULT - HISTORY OF PRESENT ILLNESS
82 yo female with history of atrial fibrillation, CAD, COPD, hyperlipidemia, hypertension, hypothyroidism, MI, peripheral vascular disease and significant family history of CAD.  Lucina is feeling fatigue than usual.  She has dyspnea on exertion, class 3 angina.  Nuclear stress test showed mild to moderate ischemia of the anterolateral wall.  Patient now here for [facility] for cardiac cath to r/o CAD.

## 2019-06-27 NOTE — ASU PATIENT PROFILE, ADULT - PMH
Abnormal stress test    Atrial fibrillation, chronic    CAD, multiple vessel    Carotid artery disease    COPD (chronic obstructive pulmonary disease)    Hyperlipidemia    Hypertension    PAD (peripheral artery disease)

## 2019-07-01 ENCOUNTER — OUTPATIENT (OUTPATIENT)
Dept: OUTPATIENT SERVICES | Facility: HOSPITAL | Age: 84
LOS: 1 days | End: 2019-07-01
Payer: MEDICARE

## 2019-07-01 DIAGNOSIS — Z95.9 PRESENCE OF CARDIAC AND VASCULAR IMPLANT AND GRAFT, UNSPECIFIED: Chronic | ICD-10-CM

## 2019-07-01 DIAGNOSIS — Z98.890 OTHER SPECIFIED POSTPROCEDURAL STATES: Chronic | ICD-10-CM

## 2019-07-01 PROCEDURE — G9001: CPT

## 2019-07-02 ENCOUNTER — INPATIENT (INPATIENT)
Facility: HOSPITAL | Age: 84
LOS: 0 days | Discharge: HOME | DRG: 247 | End: 2019-07-03
Attending: INTERNAL MEDICINE | Admitting: INTERNAL MEDICINE
Payer: MEDICARE

## 2019-07-02 ENCOUNTER — TRANSCRIPTION ENCOUNTER (OUTPATIENT)
Age: 84
End: 2019-07-02

## 2019-07-02 VITALS
HEART RATE: 111 BPM | DIASTOLIC BLOOD PRESSURE: 97 MMHG | SYSTOLIC BLOOD PRESSURE: 134 MMHG | RESPIRATION RATE: 20 BRPM | OXYGEN SATURATION: 98 % | TEMPERATURE: 98 F

## 2019-07-02 DIAGNOSIS — Z95.9 PRESENCE OF CARDIAC AND VASCULAR IMPLANT AND GRAFT, UNSPECIFIED: Chronic | ICD-10-CM

## 2019-07-02 DIAGNOSIS — Z98.890 OTHER SPECIFIED POSTPROCEDURAL STATES: Chronic | ICD-10-CM

## 2019-07-02 DIAGNOSIS — R94.39 ABNORMAL RESULT OF OTHER CARDIOVASCULAR FUNCTION STUDY: ICD-10-CM

## 2019-07-02 PROCEDURE — 93010 ELECTROCARDIOGRAM REPORT: CPT

## 2019-07-02 RX ORDER — DABIGATRAN ETEXILATE MESYLATE 150 MG/1
75 CAPSULE ORAL
Refills: 0 | Status: DISCONTINUED | OUTPATIENT
Start: 2019-07-02 | End: 2019-07-03

## 2019-07-02 RX ORDER — DRONEDARONE 400 MG/1
400 TABLET, FILM COATED ORAL
Refills: 0 | Status: DISCONTINUED | OUTPATIENT
Start: 2019-07-02 | End: 2019-07-03

## 2019-07-02 RX ORDER — LEVOTHYROXINE SODIUM 125 MCG
75 TABLET ORAL DAILY
Refills: 0 | Status: DISCONTINUED | OUTPATIENT
Start: 2019-07-02 | End: 2019-07-03

## 2019-07-02 RX ORDER — FUROSEMIDE 40 MG
40 TABLET ORAL DAILY
Refills: 0 | Status: DISCONTINUED | OUTPATIENT
Start: 2019-07-02 | End: 2019-07-03

## 2019-07-02 RX ORDER — ASPIRIN/CALCIUM CARB/MAGNESIUM 324 MG
81 TABLET ORAL DAILY
Refills: 0 | Status: DISCONTINUED | OUTPATIENT
Start: 2019-07-03 | End: 2019-07-03

## 2019-07-02 RX ORDER — SODIUM CHLORIDE 9 MG/ML
300 INJECTION INTRAMUSCULAR; INTRAVENOUS; SUBCUTANEOUS
Refills: 0 | Status: COMPLETED | OUTPATIENT
Start: 2019-07-02 | End: 2019-07-02

## 2019-07-02 RX ORDER — ATORVASTATIN CALCIUM 80 MG/1
40 TABLET, FILM COATED ORAL AT BEDTIME
Refills: 0 | Status: DISCONTINUED | OUTPATIENT
Start: 2019-07-02 | End: 2019-07-03

## 2019-07-02 RX ORDER — CLOPIDOGREL BISULFATE 75 MG/1
75 TABLET, FILM COATED ORAL DAILY
Refills: 0 | Status: DISCONTINUED | OUTPATIENT
Start: 2019-07-03 | End: 2019-07-03

## 2019-07-02 RX ORDER — POTASSIUM CHLORIDE 20 MEQ
1 PACKET (EA) ORAL
Qty: 0 | Refills: 0 | DISCHARGE

## 2019-07-02 RX ORDER — METOPROLOL TARTRATE 50 MG
100 TABLET ORAL DAILY
Refills: 0 | Status: DISCONTINUED | OUTPATIENT
Start: 2019-07-02 | End: 2019-07-03

## 2019-07-02 RX ADMIN — DABIGATRAN ETEXILATE MESYLATE 75 MILLIGRAM(S): 150 CAPSULE ORAL at 18:03

## 2019-07-02 RX ADMIN — DRONEDARONE 400 MILLIGRAM(S): 400 TABLET, FILM COATED ORAL at 18:03

## 2019-07-02 RX ADMIN — ATORVASTATIN CALCIUM 40 MILLIGRAM(S): 80 TABLET, FILM COATED ORAL at 21:23

## 2019-07-02 RX ADMIN — SODIUM CHLORIDE 300 MILLILITER(S): 9 INJECTION INTRAMUSCULAR; INTRAVENOUS; SUBCUTANEOUS at 13:48

## 2019-07-02 NOTE — PROGRESS NOTE ADULT - SUBJECTIVE AND OBJECTIVE BOX
Subjective:    "I am having another Cardiac cath "  I have been fatigued and SOB   Mallampati 2 ASA 3   GFR 52 BRA 2.3 %         General: A/ox 3, No acute Distress  Neck: Supple, NO JVD  Cardiac: S1 S2  regularly irregular Afib on Tele  Pulmonary: CTAB, Breathing unlabored, No Rhonchi/Rales/Wheezing  Abdomen: Soft, Non -tender, +BS   Extremities: No Rashes, No edema  Neuro: A/o x 3, No focal deficits  Psch: normal mood , normal affect    LABS:

## 2019-07-02 NOTE — DISCHARGE NOTE PROVIDER - CARE PROVIDER_API CALL
Alfonso Herrera)  Cardiovascular Disease; Interventional Cardiology  [address] [place] [address]  Phone: [phone]  Fax: [phone]  Follow Up Time:

## 2019-07-02 NOTE — DISCHARGE NOTE PROVIDER - HOSPITAL COURSE
82 yo female with history of atrial fibrillation, CAD, COPD, hyperlipidemia, hypertension, hypothyroidism, MI, peripheral vascular disease and significant family history of CAD.  Lucina is feeling fatigue than usual.  She has dyspnea on exertion, class 3 angina.  Nuclear stress test showed mild to moderate ischemia of the anterolateral wall.      Pt presents today for [facility] due Class III angina     s/p LH  with  2 BRENDA LCX  PTCA angio -sculpt     Euphora 3.8wga88z 12mm  to mCirc stent  Onynx 3.72p31sd stent     ASA/ Plavix /Pradaxa therapy     Continue all previous medications     Procedure results meds and follow up d/w pt         Pt to follow up with Dr Herrera as outpatient         cardiac rehab info provided/referral and communication to cardiac rehab completed 82 yo female with history of atrial fibrillation, CAD, COPD, hyperlipidemia, hypertension, hypothyroidism, MI, peripheral vascular disease and significant family history of CAD.  Lucina is feeling fatigue than usual.  She has dyspnea on exertion, class 3 angina.  Nuclear stress test showed mild to moderate ischemia of the anterolateral wall.      Pt presents today for [facility] due Class III angina     s/p [facility]  with  2 BRENDA LCX  PTCA angio -sculpt     Euphora 3.1jjf53t 12mm  to mCirc stent  Onynx 3.98w30gl stent     ASA/ Plavix /Pradaxa therapy     Continue all previous medications     Procedure results meds and follow up d/w pt         Pt to follow up with Dr Herrera as outpatient         cardiac rehab info provided/referral and communication to cardiac rehab completed    Hospital course:     Cardiology NP F/U note:  SP: [facility] which revealed: 2 BRENDA LCX  PTCA angio -sculpt     Euphora 3.5xgr80g 12mm  to mCirc stent  Onynx 3.33c64yj stent     denies complaints of chest pain/sob/dizziness/palps overnight     jose diet / ambulating    ASSESSMENT:    83 F c/o fatigue and ESPINOZA CCS2/ Nuc EST with anteriolateral ischemia    HX CAD w pEF /COPD/AF rate controlled and AC / HLD/HTN/PVD    SP [facility] to determine progression of CAD: 7/2/19 : 2 BRENDA LCX  PTCA angio -sculpt     Euphora 3.1iwk64l 12mm  to mCirc stent  Onynx 3.85m39vt stent     -Hemodynamically stable overnight labs / ekg  reviewed no cp no arrhythmias    Plan:    continue current meds and management ASA/ Plavix/ Pradaxa/ BB / statin     stable for d/c home     med compliance/ groin care and follow up with Dr. Herrera discussed    cardiac rehab info provided/referral and communication to cardiac rehab completed            Assessment and Plan:     Problem/Plan - 1:    ·  Problem: CAD S/P percutaneous coronary angioplasty.  Plan: as above.

## 2019-07-02 NOTE — PROGRESS NOTE ADULT - ASSESSMENT
82 yo female with history of atrial fibrillation, CAD, COPD, hyperlipidemia, hypertension, hypothyroidism, MI, peripheral vascular disease and significant family history of CAD.  Lucina is feeling fatigue than usual.  She has dyspnea on exertion, class 3 angina.  Nuclear stress test showed mild to moderate ischemia of the anterolateral wall.    Pt presents today for [facility] due Class III angina   pt currently feels well appears well compensated Last dose of Pradaxa  was on Saturday 6/29    PRE-PROCEDURE ASSESSMENT  [facility]   -Patient seen and examined  -Labs reviewed  -Pre-procedure teaching completed with patient a  -Questions answered  _ GFR  52  creatinine 1.0 start iv hydration as per protocol   Pt took Plavix today

## 2019-07-02 NOTE — PROGRESS NOTE ADULT - ASSESSMENT
84 yo female with history of atrial fibrillation, CAD, COPD, hyperlipidemia, hypertension, hypothyroidism, MI, peripheral vascular disease and significant family history of CAD.  Lucina is feeling fatigue than usual.  She has dyspnea on exertion, class 3 angina.  Nuclear stress test showed mild to moderate ischemia of the anterolateral wall.    Pt presents today for [facility] due Class III angina   s/p [facility]  with  2 BRENDA LCX  PTCA angio -sculpt   Euphora 3.1soh42z 12mm  to mCirc stent  Onynx 3.78m11uz stent   Admit to hospital due to multi-vessel disease , groin access and ASA/Pradax/ Plavix /  Post procedure orders and observations   Groin precautions  Iv fluids post procedure   ASA/ Plavix /Pradaxa therapy   Continue all previous medications   Procedure results meds and follow up d/w pt   Bedrest for 4 hours   EKG post completed and reviewed   EKG and labs in am   Pt to follow up with Dr Herrera as outpatient 82 yo female with history of atrial fibrillation, CAD, COPD, hyperlipidemia, hypertension, hypothyroidism, MI, peripheral vascular disease and significant family history of CAD.  Lucina is feeling fatigue than usual.  She has dyspnea on exertion, class 3 angina.  Nuclear stress test showed mild to moderate ischemia of the anterolateral wall.    Pt presents today for [facility] due Class III angina   s/p [facility]  with  2 BRENDA LCX  PTCA angio -sculpt   Euphora 3.3mct83z 12mm  to mCirc stent  Onynx 3.95z10wt stent   Admit to hospital due to multi-vessel disease , groin access and ASA/Pradax/ Plavix /  Post procedure orders and observations   Groin precautions  Iv fluids post procedure   ASA/ Plavix /Pradaxa therapy   Continue all previous medications   Procedure results meds and follow up d/w pt   Bedrest for 4 hours   EKG post completed and reviewed   EKG and labs in am   Pt to follow up with Dr Herrera as outpatient   cardiac rehab info provided/referral and communication to cardiac rehab completed

## 2019-07-02 NOTE — DISCHARGE NOTE PROVIDER - NSDCCPCAREPLAN_GEN_ALL_CORE_FT
PRINCIPAL DISCHARGE DIAGNOSIS  Diagnosis: CAD S/P percutaneous coronary angioplasty  Assessment and Plan of Treatment: Two stents to left cirmcumflex   Apirin /Plavix and pradaxa   Groin precautions   follow up with Dr rivera 1-2 weeks

## 2019-07-02 NOTE — PROGRESS NOTE ADULT - SUBJECTIVE AND OBJECTIVE BOX
Subjective:    Medications:  atorvastatin 40 milliGRAM(s) Oral at bedtime  dabigatran 75 milliGRAM(s) Oral two times a day  dronedarone 400 milliGRAM(s) Oral two times a day  furosemide    Tablet 40 milliGRAM(s) Oral daily  levothyroxine 75 MICROGram(s) Oral daily  metoprolol succinate  milliGRAM(s) Oral daily      PHYSICAL EXAM:  Vital Signs Last 24 Hrs  T(C): 36.6 (02 Jul 2019 10:30), Max: 36.6 (02 Jul 2019 10:30)  T(F): 97.8 (02 Jul 2019 10:30), Max: 97.8 (02 Jul 2019 10:30)  HR: 60 (02 Jul 2019 14:15) (60 - 112)  BP: 126/60 (02 Jul 2019 14:15) (126/60 - 149/64)  RR: 20 (02 Jul 2019 14:15) (20 - 20)  SpO2: 94% (02 Jul 2019 14:15) (93% - 98%)    EKG : A/ Paced rate 60     General: A/ox 3, No acute Distress  Neck: Supple, NO JVD  Cardiac: S1 S2, No M/R/G  Pulmonary: CTAB, Breathing unlabored, No Rhonchi/Rales/Wheezing  Abdomen: Soft, Non -tender, +BS   Extremities: No Rashes, No edema  R groin Angio-seal + pp no evidence of bleeding   Neuro: A/o x 3, No focal deficits  Psch: normal mood , normal affect Subjective:  "I had another stent "  No chest pain or SOB post LHC via RFA tolerated well     Medications:  atorvastatin 40 milliGRAM(s) Oral at bedtime  dabigatran 75 milliGRAM(s) Oral two times a day  dronedarone 400 milliGRAM(s) Oral two times a day  furosemide    Tablet 40 milliGRAM(s) Oral daily  levothyroxine 75 MICROGram(s) Oral daily  metoprolol succinate  milliGRAM(s) Oral daily      PHYSICAL EXAM:  Vital Signs Last 24 Hrs  T(C): 36.6 (02 Jul 2019 10:30), Max: 36.6 (02 Jul 2019 10:30)  T(F): 97.8 (02 Jul 2019 10:30), Max: 97.8 (02 Jul 2019 10:30)  HR: 60 (02 Jul 2019 14:15) (60 - 112)  BP: 126/60 (02 Jul 2019 14:15) (126/60 - 149/64)  RR: 20 (02 Jul 2019 14:15) (20 - 20)  SpO2: 94% (02 Jul 2019 14:15) (93% - 98%)    EKG : A/ Paced rate 60     General: A/ox 3, No acute Distress  Neck: Supple, NO JVD  Cardiac: S1 S2, No M/R/G  Pulmonary: CTAB, Breathing unlabored, No Rhonchi/Rales/Wheezing  Abdomen: Soft, Non -tender, +BS   Extremities: No Rashes, No edema  R groin Angio-seal + pp no evidence of bleeding   Neuro: A/o x 3, No focal deficits  Psch: normal mood , normal affect

## 2019-07-03 ENCOUNTER — TRANSCRIPTION ENCOUNTER (OUTPATIENT)
Age: 84
End: 2019-07-03

## 2019-07-03 VITALS
SYSTOLIC BLOOD PRESSURE: 132 MMHG | HEART RATE: 64 BPM | DIASTOLIC BLOOD PRESSURE: 83 MMHG | TEMPERATURE: 98 F | RESPIRATION RATE: 118 BRPM

## 2019-07-03 DIAGNOSIS — I25.10 ATHEROSCLEROTIC HEART DISEASE OF NATIVE CORONARY ARTERY WITHOUT ANGINA PECTORIS: ICD-10-CM

## 2019-07-03 LAB
ANION GAP SERPL CALC-SCNC: 12 MMOL/L — SIGNIFICANT CHANGE UP (ref 5–17)
BASOPHILS # BLD AUTO: 0.03 K/UL — SIGNIFICANT CHANGE UP (ref 0–0.2)
BASOPHILS NFR BLD AUTO: 0.4 % — SIGNIFICANT CHANGE UP (ref 0–2)
BUN SERPL-MCNC: 17 MG/DL — SIGNIFICANT CHANGE UP (ref 8–20)
CALCIUM SERPL-MCNC: 9.1 MG/DL — SIGNIFICANT CHANGE UP (ref 8.6–10.2)
CHLORIDE SERPL-SCNC: 103 MMOL/L — SIGNIFICANT CHANGE UP (ref 98–107)
CO2 SERPL-SCNC: 26 MMOL/L — SIGNIFICANT CHANGE UP (ref 22–29)
CREAT SERPL-MCNC: 0.74 MG/DL — SIGNIFICANT CHANGE UP (ref 0.5–1.3)
EOSINOPHIL # BLD AUTO: 0.04 K/UL — SIGNIFICANT CHANGE UP (ref 0–0.5)
EOSINOPHIL NFR BLD AUTO: 0.5 % — SIGNIFICANT CHANGE UP (ref 0–6)
GLUCOSE SERPL-MCNC: 125 MG/DL — HIGH (ref 70–115)
HCT VFR BLD CALC: 39.4 % — SIGNIFICANT CHANGE UP (ref 34.5–45)
HGB BLD-MCNC: 12.6 G/DL — SIGNIFICANT CHANGE UP (ref 11.5–15.5)
IMM GRANULOCYTES NFR BLD AUTO: 0.4 % — SIGNIFICANT CHANGE UP (ref 0–1.5)
LYMPHOCYTES # BLD AUTO: 2.66 K/UL — SIGNIFICANT CHANGE UP (ref 1–3.3)
LYMPHOCYTES # BLD AUTO: 31.3 % — SIGNIFICANT CHANGE UP (ref 13–44)
MCHC RBC-ENTMCNC: 29.8 PG — SIGNIFICANT CHANGE UP (ref 27–34)
MCHC RBC-ENTMCNC: 32 GM/DL — SIGNIFICANT CHANGE UP (ref 32–36)
MCV RBC AUTO: 93.1 FL — SIGNIFICANT CHANGE UP (ref 80–100)
MONOCYTES # BLD AUTO: 1.04 K/UL — HIGH (ref 0–0.9)
MONOCYTES NFR BLD AUTO: 12.2 % — SIGNIFICANT CHANGE UP (ref 2–14)
NEUTROPHILS # BLD AUTO: 4.69 K/UL — SIGNIFICANT CHANGE UP (ref 1.8–7.4)
NEUTROPHILS NFR BLD AUTO: 55.2 % — SIGNIFICANT CHANGE UP (ref 43–77)
PLATELET # BLD AUTO: 182 K/UL — SIGNIFICANT CHANGE UP (ref 150–400)
POTASSIUM SERPL-MCNC: 3.8 MMOL/L — SIGNIFICANT CHANGE UP (ref 3.5–5.3)
POTASSIUM SERPL-SCNC: 3.8 MMOL/L — SIGNIFICANT CHANGE UP (ref 3.5–5.3)
RBC # BLD: 4.23 M/UL — SIGNIFICANT CHANGE UP (ref 3.8–5.2)
RBC # FLD: 12.9 % — SIGNIFICANT CHANGE UP (ref 10.3–14.5)
SODIUM SERPL-SCNC: 141 MMOL/L — SIGNIFICANT CHANGE UP (ref 135–145)
WBC # BLD: 8.49 K/UL — SIGNIFICANT CHANGE UP (ref 3.8–10.5)
WBC # FLD AUTO: 8.49 K/UL — SIGNIFICANT CHANGE UP (ref 3.8–10.5)

## 2019-07-03 PROCEDURE — 93010 ELECTROCARDIOGRAM REPORT: CPT

## 2019-07-03 PROCEDURE — 93458 L HRT ARTERY/VENTRICLE ANGIO: CPT | Mod: XU

## 2019-07-03 PROCEDURE — C1887: CPT

## 2019-07-03 PROCEDURE — C1874: CPT

## 2019-07-03 PROCEDURE — C1894: CPT

## 2019-07-03 PROCEDURE — 93005 ELECTROCARDIOGRAM TRACING: CPT

## 2019-07-03 PROCEDURE — 85027 COMPLETE CBC AUTOMATED: CPT

## 2019-07-03 PROCEDURE — C1760: CPT

## 2019-07-03 PROCEDURE — C1769: CPT

## 2019-07-03 PROCEDURE — 99153 MOD SED SAME PHYS/QHP EA: CPT

## 2019-07-03 PROCEDURE — 36415 COLL VENOUS BLD VENIPUNCTURE: CPT

## 2019-07-03 PROCEDURE — 99152 MOD SED SAME PHYS/QHP 5/>YRS: CPT

## 2019-07-03 PROCEDURE — 80048 BASIC METABOLIC PNL TOTAL CA: CPT

## 2019-07-03 PROCEDURE — C9600: CPT | Mod: LC

## 2019-07-03 PROCEDURE — C1725: CPT

## 2019-07-03 RX ORDER — ASPIRIN/CALCIUM CARB/MAGNESIUM 324 MG
1 TABLET ORAL
Qty: 30 | Refills: 5
Start: 2019-07-03 | End: 2019-12-29

## 2019-07-03 RX ADMIN — Medication 100 MILLIGRAM(S): at 05:40

## 2019-07-03 RX ADMIN — Medication 75 MICROGRAM(S): at 05:40

## 2019-07-03 RX ADMIN — DRONEDARONE 400 MILLIGRAM(S): 400 TABLET, FILM COATED ORAL at 05:40

## 2019-07-03 RX ADMIN — DABIGATRAN ETEXILATE MESYLATE 75 MILLIGRAM(S): 150 CAPSULE ORAL at 05:40

## 2019-07-03 RX ADMIN — Medication 40 MILLIGRAM(S): at 05:40

## 2019-07-03 NOTE — DISCHARGE NOTE NURSING/CASE MANAGEMENT/SOCIAL WORK - NSDCDPATPORTLINK_GEN_ALL_CORE
You can access the [facility] Patient Portal, offered by [facility], by registering with the following website: [email or web address]

## 2019-07-03 NOTE — PROGRESS NOTE ADULT - SUBJECTIVE AND OBJECTIVE BOX
SUBJECTIVE:  Cardiology NP F/U note:  SP: [facility] which revealed: 2 BRENDA LCX  PTCA angio -sculpt   Euphora 3.5dof83l 12mm  to mCirc stent  Onynx 3.73p68rv stent   denies complaints of chest pain/sob/dizziness/palps overnight   jose diet / ambulating      	  MEDICATIONS  (STANDING):  aspirin enteric coated 81 milliGRAM(s) Oral daily  atorvastatin 40 milliGRAM(s) Oral at bedtime  clopidogrel Tablet 75 milliGRAM(s) Oral daily  dabigatran 75 milliGRAM(s) Oral two times a day  dronedarone 400 milliGRAM(s) Oral two times a day  furosemide    Tablet 40 milliGRAM(s) Oral daily  levothyroxine 75 MICROGram(s) Oral daily  metoprolol succinate  milliGRAM(s) Oral daily    MEDICATIONS  (PRN):      PHYSICAL EXAM:    T(C): 36.7 (07-03-19 @ 05:29), Max: 36.7 (07-03-19 @ 05:29)  HR: 63 (07-03-19 @ 05:29) (60 - 112)  BP: 128/66 (07-03-19 @ 05:29) (126/60 - 149/64)  RR: 18 (07-03-19 @ 05:29) (16 - 20)  SpO2: 92% (07-03-19 @ 05:29) (91% - 98%)  Wt(kg): --    I&O's Summary      Daily     Daily     Appearance: NAD	  Cardiovascular: Normal S1 S2,RRR 60  No JVD, No murmurs, No edema  Respiratory: Lungs clear to auscultation	  Psychiatry: A & O x 3, Mood & affect appropriate  Gastrointestinal:  Soft, Non-tender, + BS	x4Q  Skin: warm and dry  Neurologic: Non-focal  Extremities: Normal range of motion,:  Right Groin soft /no hematoma  dressing removed  Vascular: Peripheral pulses palpable 2+ bilaterally    TELEMETRY: A paced 60 no events on tele	    ECG:  	APaced 60/   RADIOLOGY:   DIAGNOSTIC TESTING:  [ ] Echocardiogram:    X[ ]  Catheterization:  < from: Cardiac Cath Lab - Adult (07.02.19 @ 12:21) >  VENTRICLES: There were no left ventricular global or regional wall motion  abnormalities. Global left ventricular function was normal. EF estimated  was 60 %.  VALVES: MITRAL VALVE: The mitral valve exhibited mild regurgitation.  CORONARY VESSELS: The coronary circulation is left dominant.  LM:   --  LM: Normal.  LAD:   --  Ostial LAD: There was a discrete 25 % stenosis.  --  Proximal LAD: There was a diffuse 0 % stenosis at the site of a prior  stent.  --  Mid LAD: There was a diffuse 0 % stenosis at the site of a prior stent.  CX:   --  Proximal circumflex: There was a diffuse 0 % stenosis at the site  of a prior stent.  --  Mid circumflex: There was a tubular 90 % stenosis. The lesion was  eccentric and moderately calcified.  RCA:   --  Proximal RCA: There was a tubular 20 % stenosis. The lesion was  irregularly contoured.  COMPLICATIONS: No complications occurred during the cath lab visit. No  complications occurred during the cath lab visit.  SUMMARY:  1ST LESION INTERVENTIONS: A successful drug-eluting stent was performed on  the 90 % lesion in the mid circumflex. Following intervention there was an  excellent angiographic appearance with a 0 % residual stenosis.  	                                  12.6   8.49  )-----------( 182      ( 03 Jul 2019 05:58 )             39.4     07-03    141  |  103  |  17.0  ----------------------------<  125<H>  3.8   |  26.0  |  0.74    Ca    9.1      03 Jul 2019 05:58        ASSESSMENT:  83 F c/o fatigue and ESPINOZA CCS2/ Nuc EST with anteriolateral ischemia  HX CAD w pEF /COPD/AF rate controlled and AC / HLD/HTN/PVD  SP [facility] to determine progression of CAD: 7/2/19 : 2 BRENDA LCX  PTCA angio -sculpt   Euphora 3.8miv68c 12mm  to [facility] stent  Onynx 3.89e80vh stent   -Hemodynamically stable overnight labs / ekg  reviewed no cp no arrhythmias  Plan:  continue current meds and management ASA/ Plavix/ Pradaxa/ BB / statin   stable for d/c home   med compliance/ groin care and follow up with Dr. Herrera discussed  cardiac rehab info provided/referral and communication to cardiac rehab completed

## 2019-07-05 DIAGNOSIS — Z71.89 OTHER SPECIFIED COUNSELING: ICD-10-CM

## 2022-07-14 NOTE — ASU PATIENT PROFILE, ADULT - IS PATIENT PREGNANT?
[facility]  Brief Op Note/Discharge Note  Short Stay    Admit Date: 7/14/2022    Discharge Date: 07/14/2022    Attending Physician: No att. 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 4 areas on vulva laser ablated without complications    Estimated Blood Loss: * No values recorded between 7/14/2022  7:50 AM and 7/14/2022  8:18 AM *         Specimens:   Specimen (24h ago, onward)            None          Discharge Provider: Mireille Glass    Diagnoses:  Active Hospital Problems    Diagnosis  POA    s/p laser of vulva [N90.3]  Yes      Resolved Hospital Problems   No resolved problems to display.       Discharged Condition: good    Hospital Course:   Patient was admitted for outpatient procedure as above, and tolerated the procedure well with no complications. Please see operative report for further details. Following the procedure, the patient was awakened from anesthesia and transferred to the recovery area in stable condition. She was discharged to home once ambulating, voiding, tolerating PO intake, and pain was well-controlled. Patient was given routine post-op instructions and prescriptions for pain medication to take as needed. Patient instructed to follow up with Dr. Gonzalez in 4-6 weeks.    Final Diagnoses: Same as principal problem.    Disposition: Home or Self Care    Follow up/Patient Instructions:    Medications:  Reconciled Home Medications:      Medication List      START taking these medications    acetaminophen 650 MG Tbsr  Commonly known as: TYLENOL  Take 1 tablet (650 mg total) by mouth every 6 (six) hours.     silver sulfADIAZINE 1% 1 % cream  Commonly known as:  SILVADENE  Apply topically 2 (two) times daily.        CONTINUE taking these medications    albuterol 90 mcg/actuation inhaler  Commonly known as: PROVENTIL/VENTOLIN HFA  SMARTSI Puff(s) By Mouth Every 4 Hours PRN     amLODIPine 2.5 MG tablet  Commonly known as: NORVASC  Take 1 tablet (2.5 mg total) by mouth once daily.     atorvastatin 10 MG tablet  Commonly known as: LIPITOR  Atrovastatin 20 mg tablet 1 Tablet(s) PO daily     clopidogreL 75 mg tablet  Commonly known as: PLAVIX  Take 1 tablet (75 mg total) by mouth once daily.     isosorbide mononitrate 30 MG 24 hr tablet  Commonly known as: IMDUR  Take 1 tablet (30 mg total) by mouth once daily.     metoprolol succinate 25 MG 24 hr tablet  Commonly known as: TOPROL-XL  Take 0.5 tablets (12.5 mg total) by mouth once daily.     ondansetron 4 MG tablet  Commonly known as: ZOFRAN  1 tablet     promethazine 12.5 MG Tab  Commonly known as: PHENERGAN  SMARTSI Tablet(s) By Mouth Every 12 Hours     venlafaxine 37.5 MG 24 hr capsule  Commonly known as: EFFEXOR-XR  Take 37.5 mg by mouth once daily.          Discharge Procedure Orders   Diet general     Leave dressing on - Keep it clean, dry, and intact until clinic visit     Call MD for:  temperature >100.4     Call MD for:  persistent nausea and vomiting     Call MD for:  severe uncontrolled pain     Call MD for:  difficulty breathing, headache or visual disturbances     Call MD for:  redness, tenderness, or signs of infection (pain, swelling, redness, odor or green/yellow discharge around incision site)     Call MD for:  hives     Call MD for:   Order Comments: inability to void,urine is ketchup colored or you have large clots, vaginal bleeding is heavier than a period.    PAIN MEDICATIONS:     Take your pain medications as instructed. It is best to take pain medications before your pain becomes severe. This will allow you to take less medication yet have better pain relief. For the first 2 or 3 days it may be  helpful to take your pain medications on a regular schedule (e.g. every 4 to 6 hours). This will help you to keep your pain under better control. You should then begin to take fewer medications each day until you no longer need them. Do not take pain medication on an empty stomach. This may lead to nausea and vomiting.     Activity as tolerated   Order Comments: Return to normal activity slowly as you feel able.  For 6 weeks your exercise should be limited to walking.  You may walk as far as you wish, as long as you increase your level of exertion gradually and avoid slippery surfaces.     Shower on day dressing removed (No bath)   Order Comments: You may shower at any time but should avoid immersing any abdominal incisions in water for at least 2 weeks after surgery or until the wound is completely healed.  If given, please shower with Hibaclens soap until bottle is completely finish     Mireille Glass MD  PGY-2 OB/GYN       no

## 2022-12-02 PROBLEM — Z00.00 ENCOUNTER FOR PREVENTIVE HEALTH EXAMINATION: Status: ACTIVE | Noted: 2022-12-02

## 2022-12-05 ENCOUNTER — APPOINTMENT (OUTPATIENT)
Dept: UROLOGY | Facility: CLINIC | Age: 87
End: 2022-12-05

## 2022-12-05 VITALS
WEIGHT: 190 LBS | DIASTOLIC BLOOD PRESSURE: 77 MMHG | OXYGEN SATURATION: 93 % | BODY MASS INDEX: 34.96 KG/M2 | RESPIRATION RATE: 18 BRPM | HEART RATE: 71 BPM | SYSTOLIC BLOOD PRESSURE: 140 MMHG | HEIGHT: 62 IN

## 2022-12-05 DIAGNOSIS — Z78.9 OTHER SPECIFIED HEALTH STATUS: ICD-10-CM

## 2022-12-05 DIAGNOSIS — Z87.09 PERSONAL HISTORY OF OTHER DISEASES OF THE RESPIRATORY SYSTEM: ICD-10-CM

## 2022-12-05 DIAGNOSIS — Z86.79 PERSONAL HISTORY OF OTHER DISEASES OF THE CIRCULATORY SYSTEM: ICD-10-CM

## 2022-12-05 DIAGNOSIS — Z63.4 DISAPPEARANCE AND DEATH OF FAMILY MEMBER: ICD-10-CM

## 2022-12-05 DIAGNOSIS — Z87.891 PERSONAL HISTORY OF NICOTINE DEPENDENCE: ICD-10-CM

## 2022-12-05 PROCEDURE — 99203 OFFICE O/P NEW LOW 30 MIN: CPT

## 2022-12-05 RX ORDER — SOLIFENACIN SUCCINATE 5 MG/1
5 TABLET ORAL
Qty: 30 | Refills: 1 | Status: ACTIVE | COMMUNITY
Start: 2022-12-05 | End: 1900-01-01

## 2022-12-05 SDOH — SOCIAL STABILITY - SOCIAL INSECURITY: DISSAPEARANCE AND DEATH OF FAMILY MEMBER: Z63.4

## 2022-12-06 PROBLEM — Z87.891 FORMER SMOKER: Status: ACTIVE | Noted: 2022-12-06

## 2022-12-06 PROBLEM — Z86.79 HISTORY OF HEART FAILURE: Status: RESOLVED | Noted: 2022-12-06 | Resolved: 2022-12-06

## 2022-12-06 PROBLEM — Z63.4 WIDOWED: Status: ACTIVE | Noted: 2022-12-06

## 2022-12-06 PROBLEM — Z87.09 HISTORY OF CHRONIC OBSTRUCTIVE LUNG DISEASE: Status: RESOLVED | Noted: 2022-12-06 | Resolved: 2022-12-06

## 2022-12-06 PROBLEM — Z86.79 HISTORY OF ATRIAL FIBRILLATION: Status: RESOLVED | Noted: 2022-12-06 | Resolved: 2022-12-06

## 2022-12-06 PROBLEM — Z78.9 NO SIGNIFICANT FAMILY HISTORY: Status: ACTIVE | Noted: 2022-12-06

## 2022-12-06 LAB
APPEARANCE: CLEAR
BACTERIA: NEGATIVE
BILIRUBIN URINE: NEGATIVE
BLOOD URINE: NEGATIVE
COLOR: NORMAL
GLUCOSE QUALITATIVE U: NEGATIVE
HYALINE CASTS: 9 /LPF
KETONES URINE: NEGATIVE
LEUKOCYTE ESTERASE URINE: NEGATIVE
MICROSCOPIC-UA: NORMAL
NITRITE URINE: NEGATIVE
PH URINE: 5.5
PROTEIN URINE: NEGATIVE
RED BLOOD CELLS URINE: 2 /HPF
SPECIFIC GRAVITY URINE: 1.01
SQUAMOUS EPITHELIAL CELLS: 3 /HPF
UROBILINOGEN URINE: NORMAL
WHITE BLOOD CELLS URINE: 1 /HPF

## 2022-12-06 NOTE — HISTORY OF PRESENT ILLNESS
[FreeTextEntry1] : 88yo F Afib, heart failure presents for urinary urgency and urge incontinence since past month\par She reports she had COVID 1 month ago and started noticing symptoms since then. She was treated for UTI also ~1 month ago.\par She reports symptoms have slowly been getting better. \par Denies dysuria or hematuria. \par She is also on lasix for heart failure.

## 2022-12-06 NOTE — ASSESSMENT
[FreeTextEntry1] : Urinary urgency/incontinence:\par ? related to covid + uti 1 month ago\par now slowly resolving\par advised to monitor \par Patient is going away to [place]. Will prescribe VESIcare - to start only if symptoms starts to worsen.\par Patient will call 1 month after starting vesicare to report symptoms

## 2022-12-06 NOTE — LETTER BODY
[Dear  ___] : Dear  [unfilled], [Consult Letter:] : I had the pleasure of evaluating your patient, [unfilled]. [Please see my note below.] : Please see my note below. [Consult Closing:] : Thank you very much for allowing me to participate in the care of this patient.  If you have any questions, please do not hesitate to contact me. [Sincerely,] : Sincerely, [FreeTextEntry3] : Minnie Coronado, \par Genitourinary Medicine\par [facility]\par

## 2022-12-08 ENCOUNTER — NON-APPOINTMENT (OUTPATIENT)
Age: 87
End: 2022-12-08

## 2022-12-08 LAB — BACTERIA UR CULT: NORMAL

## 2023-03-27 ENCOUNTER — NON-APPOINTMENT (OUTPATIENT)
Age: 88
End: 2023-03-27

## 2023-04-06 ENCOUNTER — APPOINTMENT (OUTPATIENT)
Dept: OTOLARYNGOLOGY | Facility: CLINIC | Age: 88
End: 2023-04-06
Payer: MEDICARE

## 2023-04-06 VITALS — TEMPERATURE: 97 F | HEIGHT: 62 IN | WEIGHT: 190 LBS | BODY MASS INDEX: 34.96 KG/M2

## 2023-04-06 DIAGNOSIS — Z83.438 FAMILY HISTORY OF OTHER DISORDER OF LIPOPROTEIN METABOLISM AND OTHER LIPIDEMIA: ICD-10-CM

## 2023-04-06 DIAGNOSIS — Z86.79 PERSONAL HISTORY OF OTHER DISEASES OF THE CIRCULATORY SYSTEM: ICD-10-CM

## 2023-04-06 DIAGNOSIS — Z83.3 FAMILY HISTORY OF DIABETES MELLITUS: ICD-10-CM

## 2023-04-06 DIAGNOSIS — H81.13 BENIGN PAROXYSMAL VERTIGO, BILATERAL: ICD-10-CM

## 2023-04-06 DIAGNOSIS — E78.5 HYPERLIPIDEMIA, UNSPECIFIED: ICD-10-CM

## 2023-04-06 DIAGNOSIS — Z82.49 FAMILY HISTORY OF ISCHEMIC HEART DISEASE AND OTHER DISEASES OF THE CIRCULATORY SYSTEM: ICD-10-CM

## 2023-04-06 DIAGNOSIS — H93.13 TINNITUS, BILATERAL: ICD-10-CM

## 2023-04-06 DIAGNOSIS — H61.23 IMPACTED CERUMEN, BILATERAL: ICD-10-CM

## 2023-04-06 DIAGNOSIS — H68.022 CHRONIC EUSTACHIAN SALPINGITIS, LEFT EAR: ICD-10-CM

## 2023-04-06 DIAGNOSIS — H90.3 SENSORINEURAL HEARING LOSS, BILATERAL: ICD-10-CM

## 2023-04-06 DIAGNOSIS — Z80.9 FAMILY HISTORY OF MALIGNANT NEOPLASM, UNSPECIFIED: ICD-10-CM

## 2023-04-06 PROCEDURE — 92557 COMPREHENSIVE HEARING TEST: CPT

## 2023-04-06 PROCEDURE — 99203 OFFICE O/P NEW LOW 30 MIN: CPT | Mod: 25

## 2023-04-06 PROCEDURE — G0268 REMOVAL OF IMPACTED WAX MD: CPT

## 2023-04-06 PROCEDURE — 92567 TYMPANOMETRY: CPT

## 2023-04-06 RX ORDER — METOPROLOL TARTRATE 75 MG/1
TABLET, FILM COATED ORAL
Refills: 0 | Status: ACTIVE | COMMUNITY

## 2023-04-06 RX ORDER — CITALOPRAM HYDROBROMIDE 10 MG/1
10 TABLET, FILM COATED ORAL
Refills: 0 | Status: ACTIVE | COMMUNITY

## 2023-04-06 RX ORDER — ROSUVASTATIN CALCIUM 5 MG/1
5 TABLET, FILM COATED ORAL
Refills: 0 | Status: ACTIVE | COMMUNITY

## 2023-04-06 RX ORDER — APIXABAN 5 MG/1
5 TABLET, FILM COATED ORAL
Refills: 0 | Status: ACTIVE | COMMUNITY

## 2023-04-06 RX ORDER — ALBUTEROL 90 MCG
AEROSOL (GRAM) INHALATION
Refills: 0 | Status: ACTIVE | COMMUNITY

## 2023-04-06 RX ORDER — LEVOTHYROXINE SODIUM 0.07 MG/1
75 TABLET ORAL
Refills: 0 | Status: ACTIVE | COMMUNITY

## 2023-04-06 RX ORDER — TORSEMIDE 20 MG/1
20 TABLET ORAL
Refills: 0 | Status: ACTIVE | COMMUNITY

## 2023-04-06 NOTE — PROCEDURE
[Cerumen Impaction] : Cerumen Impaction [FreeTextEntry6] : Indication: ear plugging and discomfort\par Large amount cerumen cleared left and right ear instrumentation with curettes, forceps and suction.\par Ear canals and tympanic membranes  unremarkable.\par

## 2023-04-06 NOTE — ASSESSMENT
[FreeTextEntry1] : hx bppv now resolved\par cerumen cleared \par audio moderate loss rec au aids\par tinnitus masking function

## 2023-04-06 NOTE — REASON FOR VISIT
[Initial Consultation] : an initial consultation for [Hearing Loss] : hearing loss [Tinnitus] : tinnitus [FreeTextEntry2] : ears

## 2023-04-06 NOTE — HISTORY OF PRESENT ILLNESS
[de-identified] : one mo ago dizziness brief episodes room spinning\par triggered by turning and brief now resolved\par co gradual hearing loss co hiss sound left ear\par neg pmh re ears

## 2023-05-18 ENCOUNTER — NON-APPOINTMENT (OUTPATIENT)
Age: 88
End: 2023-05-18

## 2023-06-05 ENCOUNTER — APPOINTMENT (OUTPATIENT)
Dept: UROLOGY | Facility: CLINIC | Age: 88
End: 2023-06-05
Payer: MEDICARE

## 2023-06-05 VITALS
BODY MASS INDEX: 34.96 KG/M2 | WEIGHT: 190 LBS | HEIGHT: 62 IN | SYSTOLIC BLOOD PRESSURE: 113 MMHG | DIASTOLIC BLOOD PRESSURE: 78 MMHG | HEART RATE: 71 BPM

## 2023-06-05 PROCEDURE — 99213 OFFICE O/P EST LOW 20 MIN: CPT

## 2023-06-05 NOTE — HISTORY OF PRESENT ILLNESS
[FreeTextEntry1] : 88yo F Afib, heart failure presents for f/u\par She reports since last week she started noticing frequency, urgency and occasional incontinence\par Her PCP gave her keflex, she started 2 days ago\par \par Denies dysuria or hematuria. \par She is also on torsemide for heart failure.

## 2023-06-05 NOTE — ASSESSMENT
[FreeTextEntry1] : Urinary urgency/incontinence:\par ?UTI\par check culture - will finish course of keflex by her PCP\par if symptoms persists, will get US in 2 weeks and f/u

## 2023-06-05 NOTE — LETTER BODY
[Dear  ___] : Dear  [unfilled], [Consult Letter:] : I had the pleasure of evaluating your patient, [unfilled]. [Please see my note below.] : Please see my note below. [Consult Closing:] : Thank you very much for allowing me to participate in the care of this patient.  If you have any questions, please do not hesitate to contact me. [Sincerely,] : Sincerely, [FreeTextEntry3] : Minnie Coronado, \par Genitourinary Medicine\par [facility]\par

## 2023-06-07 LAB — BACTERIA UR CULT: NORMAL

## 2023-06-26 ENCOUNTER — APPOINTMENT (OUTPATIENT)
Dept: UROLOGY | Facility: CLINIC | Age: 88
End: 2023-06-26

## 2023-06-28 NOTE — H&P PST ADULT - MALLAMPATI CLASS
Patient ID: Ms. Berry is a 65 year old female.    Chief Complaint   Patient presents with   • Hospital F/U   • Office Visit       HPI:    HTN  Patient is taking medication  She is taking 1/2 pill daily  She has no frequent HA, dizziness or chest pain    Overweight   Her weight is down  Her appetite was decreased since being in the hospital    Pancreatic Cancer  She was recently diagnosed in the hospital  She went to ER for dark urine and back pain  She has seen surgeon and Onc  Going to get chemo every 2 weeks for 4 cycles  She had stricture in CBD - a stent was placed  She has fatigue  She has lost weight  Her appetite is getting better        ALLERGIES:   Allergen Reactions   • Cat Dander Other (See Comments)     Sneezing, watery eyes.     Current Outpatient Medications   Medication Sig Dispense Refill   • metoPROLOL succinate (TOPROL-XL) 25 MG 24 hr tablet Take 0.5 tablets by mouth at bedtime. 90 tablet 0   • pantoprazole (PROTONIX) 40 MG tablet Take 1 tablet by mouth daily (before breakfast). Do not start before June 18, 2023. 30 tablet 0   • acetaminophen (TYLENOL) 325 MG tablet Take 2 tablets by mouth every 4 hours as needed for Pain. 30 tablet 0   • docusate sodium-sennosides (SENOKOT S) 50-8.6 MG per tablet Take 2 tablets by mouth daily. 60 tablet 0   • loratadine (CLARITIN) 10 MG tablet Take 10 mg by mouth daily.     • aspirin 81 MG EC tablet Take 1 tablet by mouth daily. TAKE 1 TABLET BY MOUTH EVERY DAY 90 tablet 3     No current facility-administered medications for this visit.     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   • Hyperproteinemia   • Vision disturbance   • Scleral icterus   • Choledocholithiasis   • Biliary obstruction   • Hyperbilirubinemia   • Transaminitis   • 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)     Past Medical History:   Diagnosis Date   • Essential (primary) hypertension    • Need for Tdap vaccination 01/23/2020   • Osteoarthritis     shoulder   • Upper respiratory tract infection 04/20/2022     Social History     Tobacco Use   Smoking Status Never   Smokeless Tobacco Never     Patient Active Problem List     Substance and Sexual Activity   Drug Use Never     Social History     Substance and Sexual Activity   Alcohol Use Never     Family History   Problem Relation Age of Onset   • Hypertension Mother    • Cancer, Pancreatic Father      Patient's medications, allergies, past medical, surgical, social and family histories were reviewed and updated as appropriate.    Review of Systems   Constitutional: Positive for fatigue.   HENT: Negative.    Eyes: Negative.    Respiratory: Negative.    Cardiovascular: Negative.    Gastrointestinal: Negative.    Endocrine: Negative.    Skin: Negative.    Allergic/Immunologic: Negative.    Hematological: Negative.    Psychiatric/Behavioral: Negative.      Visit Vitals  /69   Pulse 85   Temp 97.7 °F (36.5 °C) (Oral)   Resp 17   Ht 5' 6\" (1.676 m)   Wt 76.9 kg (169 lb 8.5 oz)   SpO2 100%   BMI 27.36 kg/m²     Physical Exam  Vitals and nursing note reviewed.   Constitutional:       Appearance: Normal appearance. She is well-developed.   HENT:      Head: Normocephalic.      Right Ear: Tympanic membrane normal.      Left Ear: Tympanic membrane normal.      Nose: Nose normal.      Mouth/Throat:      Pharynx: No oropharyngeal exudate.   Eyes:      Conjunctiva/sclera: Conjunctivae normal.      Pupils: Pupils are equal, round, and reactive to light.   Neck:      Thyroid: No thyroid mass or  thyromegaly.   Cardiovascular:      Rate and Rhythm: Normal rate and regular rhythm.      Pulses: Normal pulses.      Heart sounds: Normal heart sounds.   Pulmonary:      Effort: Pulmonary effort is normal.      Breath sounds: Normal breath sounds.   Abdominal:      General: Bowel sounds are normal. There is no distension.      Palpations: Abdomen is soft. There is no mass.      Tenderness: There is no abdominal tenderness.   Musculoskeletal:      Cervical back: Normal range of motion and neck supple.   Lymphadenopathy:      Cervical: No cervical adenopathy.   Skin:     General: Skin is warm and dry.      Findings: No rash.   Neurological:      Mental Status: She is alert and oriented to person, place, and time.   Psychiatric:         Speech: Speech normal.       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        Kristel Gonzalez MD   Class II - visualization of the soft palate, fauces, and uvula

## 2023-06-29 ENCOUNTER — APPOINTMENT (OUTPATIENT)
Dept: ORTHOPEDIC SURGERY | Facility: CLINIC | Age: 88
End: 2023-06-29

## 2023-07-20 ENCOUNTER — NON-APPOINTMENT (OUTPATIENT)
Age: 88
End: 2023-07-20

## 2023-10-23 RX ORDER — CHLORHEXIDINE GLUCONATE 213 G/1000ML
1 SOLUTION TOPICAL ONCE
Refills: 0 | Status: DISCONTINUED | OUTPATIENT
Start: 2023-10-24 | End: 2023-10-25

## 2023-10-23 NOTE — H&P PST ADULT - HISTORY OF PRESENT ILLNESS
Department of Cardiology                                                                  [facility]/[place] [address]                                                            Telephone: [phone]. Fax:[phone]                                                                             Pre- Procedure Progress Note      HPI:  87yo female with h/o HTN, HLD, hypothyroid, COPD, CAD/MI with prior PCI to LAD/LCx, AFib on Eliquis with prior RFA 2022, PPM, HFrEF with LVEF previously 45%-now improved to 55-60%, PAD, prior COVID, followed by Dr Beltre for rectal bleeding, endorsing progressive ESPINOZA and fatigue, NST with anterolateral ischemia, now presenting for [facility] to R/O CAD progression vs ISR.         Symptoms:        Angina (Class): III       Ischemic Symptoms: ESPINOZA angina equivalent    Heart Failure:        Systolic/Diastolic/Combined: systolic       NYHA Class (within 2 weeks): III      Echo 4/27/23:   LVEF 55-60%, diastolic dysfunction, mild LVH  mild to mod MR/TR/PHTN  Prior Cardiac Interventions:         Stress Test 10/2/23:       Protocol: Regadenoson       EKG Changes: none       Myocardial Imaging: small, mild, distal ante and anteroapical partially reversible defect and mod size, ante, lateral, anterolateral fixed defect, LVEF 63%    [facility]  LMCA normal  ostial LAD 25%  pLAD patent intervention site  mLAD 25% ISR  pLCx patent intervention site  mLCx 30% at origin of OM2  dLCx patent intervention site  pRCA 30%      Associated Risk Factors:        Frailty Assessment: (none/mild/mod/severe)       Cerebrovascular Disease: N/A       Chronic Lung Disease: N/A       Peripheral Arterial Disease: N/A       Chronic Kidney Disease (if yes, what is GFR): N/A       Uncontrolled Diabetes (if yes, what is HgbA1C or FBS): N/A       Poorly Controlled Hypertension (if yes, what is SBP): N/A       Morbid Obesity (if yes, what is BMI): N/A       History of Recent Ventricular Arrhythmia: N/A       Inability to Ambulate Safely: N/A       Need for Therapeutic Anticoagulation: N/A       Antiplatelet or Contrast Allergy: N/A    Antianginal Therapies:        Beta Blockers:  metoprolol       Calcium Channel Blockers:        Long Acting Nitrates:        Ranexa:       Indication:     Risk Stratification:  ASA:  Mallampati:  Bleeding Risk:  Creatinine:  GFR:    Coronary anatomy:    Plan/Recommendations:   -plan for ****  -preferred access: RRA ***  -patient seen and examined  -confirmed appropriate NPO duration  -ECG and Labs reviewed  -Aspirin 81mg po pre-cath***  -NS 250mL IV bolus pre-cath***  -procedure discussed with patient; risks and benefits explained, questions answered  -consent obtained by attending IC      Risks, benefits, and alternatives reviewed.  Risks including but not limited to MI, death, stroke, bleeding, infection, vessel injury, hematoma, renal failure, allergic reaction, urgent open heart surgery, restenosis and stent thrombosis were reviewed.  All questions answered.  Patient is agreeable to proceed.                                                                              Department of Cardiology                                                                  [facility]/[place] [address]                                                            Telephone: [phone]. Fax:[phone]                                                                             Pre- Procedure Progress Note      HPI:  89yo female with h/o HTN, HLD, hypothyroid, COPD, CAD/MI with prior PCI to LAD/LCx, AFib on Eliquis with prior RFA 2022, PPM, HFrEF with LVEF previously 45%-now improved to 55-60%, PAD, prior COVID, followed by Dr Beltre for rectal bleeding, endorsing progressive ESPINOZA and fatigue, NST with anterolateral ischemia, now presenting for [facility] to R/O CAD progression vs ISR.         Symptoms:        Angina (Class): III       Ischemic Symptoms: ESPINOZA angina equivalent    Heart Failure:        Systolic/Diastolic/Combined: systolic       NYHA Class (within 2 weeks): III      Echo 4/27/23:   LVEF 55-60%, diastolic dysfunction, mild LVH  mild to mod MR/TR/PHTN  Prior Cardiac Interventions:         Stress Test 10/2/23:       Protocol: Regadenoson       EKG Changes: none       Myocardial Imaging: small, mild, distal ante and anteroapical partially reversible defect and mod size, ante, lateral, anterolateral fixed defect, LVEF 63%    [facility]  LMCA normal  ostial LAD 25%  pLAD patent intervention site  mLAD 25% ISR  pLCx patent intervention site  mLCx 30% at origin of OM2  dLCx patent intervention site  pRCA 30%      Associated Risk Factors:        Frailty Assessment: (none/mild/mod/severe): moderate       Cerebrovascular Disease: N/A       Chronic Lung Disease: N/A       Peripheral Arterial Disease: N/A       Chronic Kidney Disease (if yes, what is GFR): N/A       Uncontrolled Diabetes (if yes, what is HgbA1C or FBS): N/A       Poorly Controlled Hypertension (if yes, what is SBP): N/A       Morbid Obesity (if yes, what is BMI): N/A       History of Recent Ventricular Arrhythmia: N/A       Inability to Ambulate Safely: N/A       Need for Therapeutic Anticoagulation: Eliquis for A Fib       Antiplatelet or Contrast Allergy: N/A    Antianginal Therapies:        Beta Blockers:  metoprolol       Calcium Channel Blockers:        Long Acting Nitrates:        Ranexa:     Vital Signs Last 24 Hrs  T(C): 36.8 (24 Oct 2023 14:40), Max: 36.8 (24 Oct 2023 14:40)  T(F): 98.2 (24 Oct 2023 14:40), Max: 98.2 (24 Oct 2023 14:40)  HR: 71 (24 Oct 2023 14:40) (71 - 71)  BP: 126/66 (24 Oct 2023 14:40) (126/66 - 126/66)  BP(mean): --  RR: 16 (24 Oct 2023 14:40) (16 - 16)  SpO2: 93% (24 Oct 2023 14:40) (93% - 93%)    Parameters below as of 24 Oct 2023 14:40  Patient On (Oxygen Delivery Method): room air    ECG: Paced 72BPM    ROS: as stated above, otherwise negative    PHYSICAL EXAM:  Constitutional: A & O x 3, NAD  HEENT:  Normal oral mucosa, PERRL, EOMI	  Cardiovascular: S1 S2, No murmurs, No JVD  Respiratory: Lungs clear to auscultation	  Gastrointestinal:  Soft, Non-tender, + BS	  Skin: No rashes or cyanosis  Neurologic: No deficit appreciated  Extremities: Normal range of motion, no edema  Vascular: distal pulses +

## 2023-10-23 NOTE — H&P PST ADULT - ASSESSMENT
Indication: 89yo female with multiple CVD risk factors, known CAD with prior MI and multiple PCIs, c/o progressive ESPINOZA and fatigue, +NST with anterolateral ischemia, improved LVEF from 45% to 63%, now presents for LHC +/- PCI to be performed by Dr Herrera.     Risk Stratification:  ASA:  Mallampati:  Bleeding Risk:  Creatinine:  GFR:    Coronary anatomy:    Plan/Recommendations:   -plan for LHC +/- PCI  -preferred access: RRA ***  -patient seen and examined  -confirmed appropriate NPO duration  -ECG and Labs reviewed  -Aspirin 81mg po pre-cath***  -NS 250mL IV bolus pre-cath***  -procedure discussed with patient; risks and benefits explained, questions answered  -consent obtained by attending IC      Risks, benefits, and alternatives reviewed.  Risks including but not limited to MI, death, stroke, bleeding, infection, vessel injury, hematoma, renal failure, allergic reaction, urgent open heart surgery, restenosis and stent thrombosis were reviewed.  All questions answered.  Patient is agreeable to proceed.      Indication: 87yo female with multiple CVD risk factors, known CAD with prior MI and multiple PCIs, c/o progressive ESPINOZA and fatigue, +NST with anterolateral ischemia, improved LVEF from 45% to 63%, now presents for LHC +/- PCI to be performed by Dr Herrera.     Risk Stratification:  ASA: 3  Mallampati: 2  Bleeding Risk: 1.9  Creatinine: 0.78  GFR: 74    Coronary anatomy:  [facility]  LMCA normal  ostial LAD 25%  pLAD patent intervention site  mLAD 25% ISR  pLCx patent intervention site  mLCx 30% at origin of OM2  dLCx patent intervention site  pRCA 30%      Plan/Recommendations:   -plan for LHC +/- PCI  -preferred access: RRA   -patient seen and examined  -confirmed appropriate NPO duration  -ECG and Labs reviewed  -Aspirin 81mg po pre-cath  -NS 250mL IV bolus pre-cath  -procedure discussed with patient; risks and benefits explained, questions answered  -consent obtained by attending IC      Risks, benefits, and alternatives reviewed.  Risks including but not limited to MI, death, stroke, bleeding, infection, vessel injury, hematoma, renal failure, allergic reaction, urgent open heart surgery, restenosis and stent thrombosis were reviewed.  All questions answered.  Patient is agreeable to proceed.

## 2023-10-24 ENCOUNTER — TRANSCRIPTION ENCOUNTER (OUTPATIENT)
Age: 88
End: 2023-10-24

## 2023-10-24 ENCOUNTER — INPATIENT (INPATIENT)
Facility: HOSPITAL | Age: 88
LOS: 0 days | Discharge: HOME | DRG: 287 | End: 2023-10-25
Attending: INTERNAL MEDICINE | Admitting: INTERNAL MEDICINE
Payer: MEDICARE

## 2023-10-24 VITALS
HEART RATE: 71 BPM | SYSTOLIC BLOOD PRESSURE: 126 MMHG | DIASTOLIC BLOOD PRESSURE: 66 MMHG | TEMPERATURE: 98 F | RESPIRATION RATE: 16 BRPM | OXYGEN SATURATION: 93 %

## 2023-10-24 DIAGNOSIS — Z95.9 PRESENCE OF CARDIAC AND VASCULAR IMPLANT AND GRAFT, UNSPECIFIED: Chronic | ICD-10-CM

## 2023-10-24 DIAGNOSIS — R94.39 ABNORMAL RESULT OF OTHER CARDIOVASCULAR FUNCTION STUDY: ICD-10-CM

## 2023-10-24 DIAGNOSIS — Z98.890 OTHER SPECIFIED POSTPROCEDURAL STATES: Chronic | ICD-10-CM

## 2023-10-24 LAB
ANION GAP SERPL CALC-SCNC: 11 MMOL/L — SIGNIFICANT CHANGE UP (ref 5–17)
ANION GAP SERPL CALC-SCNC: 11 MMOL/L — SIGNIFICANT CHANGE UP (ref 5–17)
BASOPHILS # BLD AUTO: 0.05 K/UL — SIGNIFICANT CHANGE UP (ref 0–0.2)
BASOPHILS # BLD AUTO: 0.05 K/UL — SIGNIFICANT CHANGE UP (ref 0–0.2)
BASOPHILS NFR BLD AUTO: 0.5 % — SIGNIFICANT CHANGE UP (ref 0–2)
BASOPHILS NFR BLD AUTO: 0.5 % — SIGNIFICANT CHANGE UP (ref 0–2)
BUN SERPL-MCNC: 22.3 MG/DL — HIGH (ref 8–20)
BUN SERPL-MCNC: 22.3 MG/DL — HIGH (ref 8–20)
CALCIUM SERPL-MCNC: 9.6 MG/DL — SIGNIFICANT CHANGE UP (ref 8.4–10.5)
CALCIUM SERPL-MCNC: 9.6 MG/DL — SIGNIFICANT CHANGE UP (ref 8.4–10.5)
CHLORIDE SERPL-SCNC: 102 MMOL/L — SIGNIFICANT CHANGE UP (ref 96–108)
CHLORIDE SERPL-SCNC: 102 MMOL/L — SIGNIFICANT CHANGE UP (ref 96–108)
CO2 SERPL-SCNC: 24 MMOL/L — SIGNIFICANT CHANGE UP (ref 22–29)
CO2 SERPL-SCNC: 24 MMOL/L — SIGNIFICANT CHANGE UP (ref 22–29)
CREAT SERPL-MCNC: 0.78 MG/DL — SIGNIFICANT CHANGE UP (ref 0.5–1.3)
CREAT SERPL-MCNC: 0.78 MG/DL — SIGNIFICANT CHANGE UP (ref 0.5–1.3)
EGFR: 74 ML/MIN/1.73M2 — SIGNIFICANT CHANGE UP
EGFR: 74 ML/MIN/1.73M2 — SIGNIFICANT CHANGE UP
EOSINOPHIL # BLD AUTO: 0.04 K/UL — SIGNIFICANT CHANGE UP (ref 0–0.5)
EOSINOPHIL # BLD AUTO: 0.04 K/UL — SIGNIFICANT CHANGE UP (ref 0–0.5)
EOSINOPHIL NFR BLD AUTO: 0.4 % — SIGNIFICANT CHANGE UP (ref 0–6)
EOSINOPHIL NFR BLD AUTO: 0.4 % — SIGNIFICANT CHANGE UP (ref 0–6)
GLUCOSE SERPL-MCNC: 87 MG/DL — SIGNIFICANT CHANGE UP (ref 70–99)
GLUCOSE SERPL-MCNC: 87 MG/DL — SIGNIFICANT CHANGE UP (ref 70–99)
HCT VFR BLD CALC: 49.1 % — HIGH (ref 34.5–45)
HCT VFR BLD CALC: 49.1 % — HIGH (ref 34.5–45)
HGB BLD-MCNC: 15.8 G/DL — HIGH (ref 11.5–15.5)
HGB BLD-MCNC: 15.8 G/DL — HIGH (ref 11.5–15.5)
IMM GRANULOCYTES NFR BLD AUTO: 0.2 % — SIGNIFICANT CHANGE UP (ref 0–0.9)
IMM GRANULOCYTES NFR BLD AUTO: 0.2 % — SIGNIFICANT CHANGE UP (ref 0–0.9)
LYMPHOCYTES # BLD AUTO: 3.24 K/UL — SIGNIFICANT CHANGE UP (ref 1–3.3)
LYMPHOCYTES # BLD AUTO: 3.24 K/UL — SIGNIFICANT CHANGE UP (ref 1–3.3)
LYMPHOCYTES # BLD AUTO: 34.6 % — SIGNIFICANT CHANGE UP (ref 13–44)
LYMPHOCYTES # BLD AUTO: 34.6 % — SIGNIFICANT CHANGE UP (ref 13–44)
MAGNESIUM SERPL-MCNC: 2.1 MG/DL — SIGNIFICANT CHANGE UP (ref 1.8–2.6)
MAGNESIUM SERPL-MCNC: 2.1 MG/DL — SIGNIFICANT CHANGE UP (ref 1.8–2.6)
MCHC RBC-ENTMCNC: 30.6 PG — SIGNIFICANT CHANGE UP (ref 27–34)
MCHC RBC-ENTMCNC: 30.6 PG — SIGNIFICANT CHANGE UP (ref 27–34)
MCHC RBC-ENTMCNC: 32.2 GM/DL — SIGNIFICANT CHANGE UP (ref 32–36)
MCHC RBC-ENTMCNC: 32.2 GM/DL — SIGNIFICANT CHANGE UP (ref 32–36)
MCV RBC AUTO: 95 FL — SIGNIFICANT CHANGE UP (ref 80–100)
MCV RBC AUTO: 95 FL — SIGNIFICANT CHANGE UP (ref 80–100)
MONOCYTES # BLD AUTO: 1.05 K/UL — HIGH (ref 0–0.9)
MONOCYTES # BLD AUTO: 1.05 K/UL — HIGH (ref 0–0.9)
MONOCYTES NFR BLD AUTO: 11.2 % — SIGNIFICANT CHANGE UP (ref 2–14)
MONOCYTES NFR BLD AUTO: 11.2 % — SIGNIFICANT CHANGE UP (ref 2–14)
NEUTROPHILS # BLD AUTO: 4.97 K/UL — SIGNIFICANT CHANGE UP (ref 1.8–7.4)
NEUTROPHILS # BLD AUTO: 4.97 K/UL — SIGNIFICANT CHANGE UP (ref 1.8–7.4)
NEUTROPHILS NFR BLD AUTO: 53.1 % — SIGNIFICANT CHANGE UP (ref 43–77)
NEUTROPHILS NFR BLD AUTO: 53.1 % — SIGNIFICANT CHANGE UP (ref 43–77)
PLATELET # BLD AUTO: 217 K/UL — SIGNIFICANT CHANGE UP (ref 150–400)
PLATELET # BLD AUTO: 217 K/UL — SIGNIFICANT CHANGE UP (ref 150–400)
POTASSIUM SERPL-MCNC: 4.5 MMOL/L — SIGNIFICANT CHANGE UP (ref 3.5–5.3)
POTASSIUM SERPL-MCNC: 4.5 MMOL/L — SIGNIFICANT CHANGE UP (ref 3.5–5.3)
POTASSIUM SERPL-SCNC: 4.5 MMOL/L — SIGNIFICANT CHANGE UP (ref 3.5–5.3)
POTASSIUM SERPL-SCNC: 4.5 MMOL/L — SIGNIFICANT CHANGE UP (ref 3.5–5.3)
RBC # BLD: 5.17 M/UL — SIGNIFICANT CHANGE UP (ref 3.8–5.2)
RBC # BLD: 5.17 M/UL — SIGNIFICANT CHANGE UP (ref 3.8–5.2)
RBC # FLD: 13.7 % — SIGNIFICANT CHANGE UP (ref 10.3–14.5)
RBC # FLD: 13.7 % — SIGNIFICANT CHANGE UP (ref 10.3–14.5)
SODIUM SERPL-SCNC: 137 MMOL/L — SIGNIFICANT CHANGE UP (ref 135–145)
SODIUM SERPL-SCNC: 137 MMOL/L — SIGNIFICANT CHANGE UP (ref 135–145)
WBC # BLD: 9.37 K/UL — SIGNIFICANT CHANGE UP (ref 3.8–10.5)
WBC # BLD: 9.37 K/UL — SIGNIFICANT CHANGE UP (ref 3.8–10.5)
WBC # FLD AUTO: 9.37 K/UL — SIGNIFICANT CHANGE UP (ref 3.8–10.5)
WBC # FLD AUTO: 9.37 K/UL — SIGNIFICANT CHANGE UP (ref 3.8–10.5)

## 2023-10-24 RX ORDER — ROSUVASTATIN CALCIUM 5 MG/1
1 TABLET ORAL
Qty: 0 | Refills: 0 | DISCHARGE

## 2023-10-24 RX ORDER — CITALOPRAM 10 MG/1
10 TABLET, FILM COATED ORAL DAILY
Refills: 0 | Status: DISCONTINUED | OUTPATIENT
Start: 2023-10-24 | End: 2023-10-25

## 2023-10-24 RX ORDER — APIXABAN 2.5 MG/1
1 TABLET, FILM COATED ORAL
Refills: 0 | DISCHARGE

## 2023-10-24 RX ORDER — APIXABAN 2.5 MG/1
5 TABLET, FILM COATED ORAL
Refills: 0 | Status: DISCONTINUED | OUTPATIENT
Start: 2023-10-25 | End: 2023-10-25

## 2023-10-24 RX ORDER — POTASSIUM CHLORIDE 20 MEQ
1 PACKET (EA) ORAL
Refills: 0 | DISCHARGE

## 2023-10-24 RX ORDER — FUROSEMIDE 40 MG
1 TABLET ORAL
Qty: 0 | Refills: 0 | DISCHARGE

## 2023-10-24 RX ORDER — METOPROLOL TARTRATE 50 MG
100 TABLET ORAL EVERY 12 HOURS
Refills: 0 | Status: DISCONTINUED | OUTPATIENT
Start: 2023-10-24 | End: 2023-10-25

## 2023-10-24 RX ORDER — POTASSIUM CHLORIDE 20 MEQ
1 PACKET (EA) ORAL
Qty: 0 | Refills: 0 | DISCHARGE

## 2023-10-24 RX ORDER — ACETAMINOPHEN 500 MG
1000 TABLET ORAL ONCE
Refills: 0 | Status: COMPLETED | OUTPATIENT
Start: 2023-10-24 | End: 2023-10-24

## 2023-10-24 RX ORDER — HYDRALAZINE HCL 50 MG
5 TABLET ORAL ONCE
Refills: 0 | Status: COMPLETED | OUTPATIENT
Start: 2023-10-24 | End: 2023-10-24

## 2023-10-24 RX ORDER — LANOLIN ALCOHOL/MO/W.PET/CERES
10 CREAM (GRAM) TOPICAL AT BEDTIME
Refills: 0 | Status: DISCONTINUED | OUTPATIENT
Start: 2023-10-24 | End: 2023-10-25

## 2023-10-24 RX ORDER — ATORVASTATIN CALCIUM 80 MG/1
40 TABLET, FILM COATED ORAL AT BEDTIME
Refills: 0 | Status: DISCONTINUED | OUTPATIENT
Start: 2023-10-24 | End: 2023-10-25

## 2023-10-24 RX ORDER — METOPROLOL TARTRATE 50 MG
1 TABLET ORAL
Refills: 0 | DISCHARGE

## 2023-10-24 RX ORDER — DILTIAZEM HCL 120 MG
1 CAPSULE, EXT RELEASE 24 HR ORAL
Qty: 0 | Refills: 0 | DISCHARGE

## 2023-10-24 RX ORDER — UMECLIDINIUM 62.5 UG/1
0 AEROSOL, POWDER ORAL
Qty: 0 | Refills: 0 | DISCHARGE

## 2023-10-24 RX ORDER — POTASSIUM CHLORIDE 20 MEQ
20 PACKET (EA) ORAL
Refills: 0 | Status: DISCONTINUED | OUTPATIENT
Start: 2023-10-24 | End: 2023-10-25

## 2023-10-24 RX ORDER — LANOLIN ALCOHOL/MO/W.PET/CERES
1 CREAM (GRAM) TOPICAL
Refills: 0 | DISCHARGE

## 2023-10-24 RX ORDER — OMEGA-3 ACID ETHYL ESTERS 1 G
0 CAPSULE ORAL
Qty: 0 | Refills: 0 | DISCHARGE

## 2023-10-24 RX ORDER — METOPROLOL TARTRATE 50 MG
1 TABLET ORAL
Qty: 0 | Refills: 0 | DISCHARGE

## 2023-10-24 RX ORDER — PREGABALIN 225 MG/1
1 CAPSULE ORAL
Qty: 0 | Refills: 0 | DISCHARGE

## 2023-10-24 RX ORDER — LEVOTHYROXINE SODIUM 125 MCG
75 TABLET ORAL DAILY
Refills: 0 | Status: DISCONTINUED | OUTPATIENT
Start: 2023-10-24 | End: 2023-10-25

## 2023-10-24 RX ADMIN — Medication 100 MILLIGRAM(S): at 20:18

## 2023-10-24 RX ADMIN — Medication 1000 MILLIGRAM(S): at 20:30

## 2023-10-24 RX ADMIN — Medication 10 MILLIGRAM(S): at 22:04

## 2023-10-24 RX ADMIN — ATORVASTATIN CALCIUM 40 MILLIGRAM(S): 80 TABLET, FILM COATED ORAL at 22:04

## 2023-10-24 RX ADMIN — Medication 5 MILLIGRAM(S): at 19:40

## 2023-10-24 RX ADMIN — Medication 400 MILLIGRAM(S): at 19:40

## 2023-10-24 RX ADMIN — Medication 20 MILLIEQUIVALENT(S): at 22:04

## 2023-10-24 NOTE — DISCHARGE NOTE PROVIDER - NSDCCPCAREPLAN_GEN_ALL_CORE_FT
PRINCIPAL DISCHARGE DIAGNOSIS  Diagnosis: Dyspnea  Assessment and Plan of Treatment: The cardiac catheterization has ruled out significant coronary artery disease as the cause of your symptoms. You do have some minimal disease and should cont to take your medications as prescribed and manage risk factors such as high cholesterol, high blood pressure, smoking, diabetes, obesity and sedentary life style, and follow a heart healthy diet and exercise. This will prevent future significant coronary disease. You should follow up with your doctor to further investigate what the cause of your symptoms may be.      SECONDARY DISCHARGE DIAGNOSES  Diagnosis: HTN (hypertension)  Assessment and Plan of Treatment: Continue to take antihypertensive medications as prescribed. Obtain a home blood pressure monitor (at any pharmacy or medical supply store) and monitor blood pressure trends. Call your doctor if you note you blood pressure to be running much higher or lower than usual. Limit salt intake.    Diagnosis: HLD (hyperlipidemia)  Assessment and Plan of Treatment: Your diet should be low in fat, cholesterol, salt and carbohydrates, increase fruits (caution if diabetic), vegetables and whole grains/fiber rich foods. Take your cholesterol lowering medications as prescribed. Routine follow up lipid panels    Diagnosis: Afib  Assessment and Plan of Treatment: Restart your Eliquis 10/25/23. Follow up with your cardiologist for continued management of your heart rate/AFib. Notify your doctor if any chest pain, palipitations, shortness of breath or dizziness. You should have a portable home device to check your heart rate and do so with any of the above symptoms.    Diagnosis: Mitral regurgitation  Assessment and Plan of Treatment: Take your medications as prescribed. Monitor for symptoms of heart failure: increasing shortness of breath, inability to lie flat, swelling in your legs, increased abdomiinal girth or bloating, any significant increase in weight, increasing dizziness or fatigue and notify your provider.

## 2023-10-24 NOTE — DISCHARGE NOTE PROVIDER - HOSPITAL COURSE
HPI:  89yo female with h/o HTN, HLD, hypothyroid, COPD, CAD/MI with prior PCI to LAD/LCx, AFib on Eliquis with prior RFA 2022, PPM, HFrEF with LVEF previously 45%-now improved to 55-60%, PAD, prior COVID, followed by Dr Beltre for rectal bleeding, endorsing progressive ESPINOZA and fatigue, NST with anterolateral ischemia, now presenting for LHC to R/O CAD progression vs ISR.       Now s/p R/LHC via RFA with PA mean 43mmHg, PA sat 51%, PCW 25mmHg, patent intervention sites LAD/LCx, LV gram suggestive of possible significant MR, procedure performed by Dr. Herrera, received IV sedation intraprocedurally, arrived to recovery in NAD and HDS, RFA/V sheath removed and manual compression with achievement of hemostasis, access site stable, no bleed/hematoma, distal pulse +, overnight admission for monitoring, obtain TTE to better assess VHD and ventricular function.     Plan:  -Formal cath report pending  -Post procedure management/monitoring per protocol  -Access site precautions  -Bedrest x 4hours post sheath removal/achievement of hemostasis  -Routine Labs in am  -Repeat ECG if any clinical indication or change on tele  -Obtain TTE prior to D/C  -Continue current medical therapy, restart Eliquis in am if access site/CBC stable  -Educated regarding post procedure management and care  -Discussed the importance of RF modification  -Follow-up outpt in 1-2 weeks with Cardiologist Dr. Herrera  -DISPO: Plan for D/C in am if remains HDS, ECG and labs in am stable and without complications       HPI:  87yo female with h/o HTN, HLD, hypothyroid, COPD, CAD/MI with prior PCI to LAD/LCx, AFib on Eliquis with prior RFA 2022, PPM, HFrEF with LVEF previously 45%-now improved to 55-60%, PAD, prior COVID, followed by Dr Beltre for rectal bleeding, endorsing progressive ESPINOZA and fatigue, NST with anterolateral ischemia, now presenting for LHC to R/O CAD progression vs ISR.       Now s/p R/LHC via RFA/V with PA mean 43mmHg, PA sat 51%, PCW 25mmHg, patent intervention sites LAD/LCx, LV gram suggestive of possible significant MR, procedure performed by Dr. Herrera, received IV sedation intraprocedurally, arrived to recovery in NAD and HDS, RFA/V sheath removed and manual compression with achievement of hemostasis, access site stable, no bleed/hematoma, distal pulse +, overnight admission for monitoring, obtain TTE to better assess VHD and ventricular function.     Plan:  -Formal cath report pending  -Post procedure management/monitoring per protocol  -Access site precautions  -Bedrest x 4hours post sheath removal/achievement of hemostasis  -Routine Labs in am  -Repeat ECG if any clinical indication or change on tele  -Obtain TTE prior to D/C  -Continue current medical therapy, restart Eliquis in am if access site/CBC stable  -Educated regarding post procedure management and care  -Discussed the importance of RF modification  -Follow-up outpt in 1-2 weeks with Cardiologist Dr. Herrera  -DISPO: Plan for D/C in am if remains HDS, ECG and labs in am stable and without complications       HPI:  89yo female with h/o HTN, HLD, hypothyroid, COPD, CAD/MI with prior PCI to LAD/LCx, AFib on Eliquis with prior RFA 2022, PPM, HFrEF with LVEF previously 45%-now improved to 55-60%, PAD, prior COVID, followed by Dr Beltre for rectal bleeding, endorsing progressive ESPINOZA and fatigue, NST with anterolateral ischemia, now presenting for LHC to R/O CAD progression vs ISR.     Now s/p R/LHC via RFA/V with PA mean 43mmHg, PA sat 51%, PCW 25mmHg, patent intervention sites LAD/LCx, LV gram suggestive of possible significant MR, procedure performed by Dr. Herrera, received IV sedation intraprocedurally, arrived to recovery in NAD and HDS, RFA/V sheath removed and manual compression with achievement of hemostasis, access site stable, no bleed/hematoma, distal pulse +, overnight admission for monitoring, obtain TTE to better assess VHD and ventricular function.     Of note, desat to 84-87% while asleep, O2 2L N/C placed with sat 97% (has home O2 for COPD), no respiratory distress    Plan:  -Formal cath report pending  -Post procedure management/monitoring per protocol  -Access site precautions  -Bedrest x 4hours post sheath removal/achievement of hemostasis  -Routine Labs in am  -Repeat ECG if any clinical indication or change on tele  -Obtain TTE prior to D/C  -Continue current medical therapy, restart Eliquis in am if access site/CBC stable  -Educated regarding post procedure management and care  -Discussed the importance of RF modification  -Follow-up outpt in 1-2 weeks with Cardiologist Dr. Herrera  -Has f/u with pulmonologist next week  -Consider sleep study for likely VALENTE out-pt  -Encouraged utilization of home O2 especially HS  -DISPO: Plan for D/C in am if remains HDS, ECG and labs in am stable and without complications    ~1940 RFA access site with moderate bleeding, manual compression X 10min with achievement of hemostasis, hydralazine 5mg IV X 1 for SBP 190mmHg with improvement to SBP 150mmHg and due for evening BB, IV tylenol for mild groin discomfort during manual compression.     -Maintain BR and RLE straight X 3 hours  -Monitor access site closely   -Notify cardiology provider if any further bleeding or access concerns

## 2023-10-24 NOTE — DISCHARGE NOTE PROVIDER - NSDCCPTREATMENT_GEN_ALL_CORE_FT
PRINCIPAL PROCEDURE  Procedure: Left and right heart catheterization  Findings and Treatment: Go to the ED with any acute onset of chest pain, palpitations, shortness of breath or dizziness.   Managing risk factors will help prevent future blockages, risk factors may include: high blood pressure, high cholesterol, obesity, sedentary life style and smoking.    Your diet should be low in fat, cholesterol, salt and carbohydrates, increase fruits (caution if diabetic), vegetables and whole grains/fiber rich foods.   Take all your cardiac  medications as prescribed.    No heavy lifting, excessine bending or range of motion on affected limb 5-7days.  No submerging the site in water (pool/bath) 5-7days. You may start showering the day after your procedure. Remove the dressing, cleanse the area with plain soap and water and then pat dry. You may place a bandaid for 1-2 days, NO cream,/lotion/medicines on the site.   Bruising and a small nodule at the site is normal. Call your doctor for any bleeding, swelling/hardness, increasing pain or redness, loss of sensation in the hand/leg or discoloration.   If heavy bleeding or large lumps form, hold pressure at the spot and come to the Emergency Room.  Exercise is a very important factor in heart health. Once your post procedure restrictions have passed, you should engage in heart healthy, aerobic exercise. Be sure to have clearance from your cardiologist. Cardiac rehab programs could be extremely beneficial and your cardiologist could help set this up.   Follow up with your cardiologist within 1-2 weeks after your procedure.   Call your cardiologist or our unit ([phone]) with any questions or concerns that may arise.

## 2023-10-24 NOTE — DISCHARGE NOTE PROVIDER - NSDCFUADDINST_GEN_ALL_CORE_FT
Go to the ED with any acute onset of chest pain, palpitations, shortness of breath or dizziness.   Managing risk factors will help prevent future blockages, risk factors may include: high blood pressure, high cholesterol, obesity, sedentary life style and smoking.    Your diet should be low in fat, cholesterol, salt and carbohydrates, increase fruits (caution if diabetic), vegetables and whole grains/fiber rich foods.   Take all your cardiac  medications as prescribed.    No heavy lifting, excessine bending or range of motion on affected limb 5-7days.  No submerging the site in water (pool/bath) 5-7days. You may start showering the day after your procedure. Remove the dressing, cleanse the area with plain soap and water and then pat dry. You may place a bandaid for 1-2 days, NO cream,/lotion/medicines on the site.   Bruising and a small nodule at the site is normal. Call your doctor for any bleeding, swelling/hardness, increasing pain or redness, loss of sensation in the hand/leg or discoloration.   If heavy bleeding or large lumps form, hold pressure at the spot and come to the Emergency Room.  Exercise is a very important factor in heart health. Once your post procedure restrictions have passed, you should engage in heart healthy, aerobic exercise. Be sure to have clearance from your cardiologist. Cardiac rehab programs could be extremely beneficial and your cardiologist could help set this up.   Follow up with your cardiologist within 1-2 weeks after your procedure.   Call your cardiologist or our unit ([phone]) with any questions or concerns that may arise.

## 2023-10-24 NOTE — CHART NOTE - NSCHARTNOTEFT_GEN_A_CORE
Now s/p R/LHC via RFA with patent intervention sites, LV gram suggestive of possible significant MR, procedure performed by Dr. Herrera, received IV sedation intraprocedurally, arrived to recovery in NAD and HDS, RFA/V sheath removed and manual compression with achievement of hemostasis, access site stable, no bleed/hematoma, distal pulse +, overnight admission for monitoring, obtain TTE to better assess VHD and ventricular function.     Plan:  -Formal cath report pending  -Post procedure management/monitoring per protocol  -Access site precautions  -Bedrest x 4hours post sheath removal/achievement of hemostasis  -Routine Labs in am  -Repeat ECG if any clinical indication or change on tele  -Obtain TTE prior to D/C  -Continue current medical therapy, restart Eliquis in am if access site/CBC stable  -Educated regarding post procedure management and care  -Discussed the importance of RF modification  -Follow-up outpt in 1-2 weeks with Cardiologist Dr. Herrera  -DISPO: Plan for D/C in am if remains HDS, ECG and labs in am stable and without complications Now s/p R/LHC via RFA/V with PA mean 43mmHg, PA sat 51%, PCW 25mmHg, patent intervention sites LAD/LCx, LV gram suggestive of possible significant MR, procedure performed by Dr. Herrera, received IV sedation intraprocedurally, arrived to recovery in NAD and HDS, RFA/V sheath removed and manual compression with achievement of hemostasis, access site stable, no bleed/hematoma, distal pulse +, overnight admission for monitoring, obtain TTE to better assess VHD and ventricular function.     Plan:  -Formal cath report pending  -Post procedure management/monitoring per protocol  -Access site precautions  -Bedrest x 4hours post sheath removal/achievement of hemostasis  -Routine Labs in am  -Repeat ECG if any clinical indication or change on tele  -Obtain TTE prior to D/C  -Continue current medical therapy, restart Eliquis in am if access site/CBC stable  -Educated regarding post procedure management and care  -Discussed the importance of RF modification  -Follow-up outpt in 1-2 weeks with Cardiologist Dr. Herrera  -DISPO: Plan for D/C in am if remains HDS, ECG and labs in am stable and without complications Now s/p R/LHC via RFA/V with PA mean 43mmHg, PA sat 51%, PCW 25mmHg, patent intervention sites LAD/LCx, LV gram suggestive of possible significant MR, procedure performed by Dr. Herrera, received IV sedation intraprocedurally, arrived to recovery in NAD and HDS, RFA/V sheath removed and manual compression with achievement of hemostasis, access site stable, no bleed/hematoma, distal pulse +, overnight admission for monitoring, obtain TTE to better assess VHD and ventricular function.     Of note, desat to 84-87% while asleep, O2 2L N/C placed with sat 97% (has home O2 for COPD), no respiratory distress    Plan:  -Formal cath report pending  -Post procedure management/monitoring per protocol  -Access site precautions  -Bedrest x 4hours post sheath removal/achievement of hemostasis  -Routine Labs in am  -Repeat ECG if any clinical indication or change on tele  -Obtain TTE prior to D/C  -Continue current medical therapy, restart Eliquis in am if access site/CBC stable  -Educated regarding post procedure management and care  -Discussed the importance of RF modification  -Follow-up outpt in 1-2 weeks with Cardiologist Dr. Herrera  -Has f/u with pulmonologist next week  -Consider sleep study for likely VALENTE out-pt  -Encouraged utilization of home O2 especially HS  -DISPO: Plan for D/C in am if remains HDS, ECG and labs in am stable and without complications    ~1940 RFA access site with moderate bleeding, manual compression X 10min with achievement of hemostasis, hydralazine 5mg IV X 1 for SBP 190mmHg with improvement to SBP 150mmHg and due for evening BB, IV tylenol for mild groin discomfort during manual compression.   -Maintain BR and RLE straight X 3 hours  -Monitor access site closely   -Notify cardiology provider if any further bleeding or access concerns

## 2023-10-24 NOTE — DISCHARGE NOTE PROVIDER - NSDCMRMEDTOKEN_GEN_ALL_CORE_FT
citalopram 10 mg oral tablet: 1 tab(s) orally once a day  Crestor 10 mg oral tablet: 1 tab(s) orally once a day  Eliquis 5 mg oral tablet: 1 tab(s) orally 2 times a day  Incruse Ellipta 62.5 mcg/inh inhalation powder:   Klor-Con M20 oral tablet, extended release: 1 tab(s) orally 2 times a day  levothyroxine 75 mcg (0.075 mg) oral capsule: 1 cap(s) orally once a day  melatonin 10 mg oral capsule: 1 cap(s) orally once a day (at bedtime) as needed for  insomnia  metoprolol tartrate 100 mg oral tablet: 1 tab(s) orally 2 times a day  torsemide 20 mg oral tablet: 1 tab(s) orally once a day  Vitamin B-12 1000 mcg oral tablet: 1 tab(s) orally once a day

## 2023-10-24 NOTE — DISCHARGE NOTE PROVIDER - CARE PROVIDER_API CALL
Alfonso Herrera  Interventional Cardiology  [address] [place] [address], [address]  [place] [address]  Phone: [phone]  Fax: [phone]  Follow Up Time:

## 2023-10-25 ENCOUNTER — TRANSCRIPTION ENCOUNTER (OUTPATIENT)
Age: 88
End: 2023-10-25

## 2023-10-25 VITALS
RESPIRATION RATE: 16 BRPM | DIASTOLIC BLOOD PRESSURE: 62 MMHG | HEART RATE: 72 BPM | SYSTOLIC BLOOD PRESSURE: 142 MMHG | OXYGEN SATURATION: 95 %

## 2023-10-25 LAB
ALBUMIN SERPL ELPH-MCNC: 3.8 G/DL — SIGNIFICANT CHANGE UP (ref 3.3–5.2)
ALBUMIN SERPL ELPH-MCNC: 3.8 G/DL — SIGNIFICANT CHANGE UP (ref 3.3–5.2)
ALP SERPL-CCNC: 52 U/L — SIGNIFICANT CHANGE UP (ref 40–120)
ALP SERPL-CCNC: 52 U/L — SIGNIFICANT CHANGE UP (ref 40–120)
ALT FLD-CCNC: 12 U/L — SIGNIFICANT CHANGE UP
ALT FLD-CCNC: 12 U/L — SIGNIFICANT CHANGE UP
ANION GAP SERPL CALC-SCNC: 13 MMOL/L — SIGNIFICANT CHANGE UP (ref 5–17)
ANION GAP SERPL CALC-SCNC: 13 MMOL/L — SIGNIFICANT CHANGE UP (ref 5–17)
AST SERPL-CCNC: 21 U/L — SIGNIFICANT CHANGE UP
AST SERPL-CCNC: 21 U/L — SIGNIFICANT CHANGE UP
BILIRUB SERPL-MCNC: 0.6 MG/DL — SIGNIFICANT CHANGE UP (ref 0.4–2)
BILIRUB SERPL-MCNC: 0.6 MG/DL — SIGNIFICANT CHANGE UP (ref 0.4–2)
BUN SERPL-MCNC: 20.1 MG/DL — HIGH (ref 8–20)
BUN SERPL-MCNC: 20.1 MG/DL — HIGH (ref 8–20)
CALCIUM SERPL-MCNC: 9.2 MG/DL — SIGNIFICANT CHANGE UP (ref 8.4–10.5)
CALCIUM SERPL-MCNC: 9.2 MG/DL — SIGNIFICANT CHANGE UP (ref 8.4–10.5)
CHLORIDE SERPL-SCNC: 103 MMOL/L — SIGNIFICANT CHANGE UP (ref 96–108)
CHLORIDE SERPL-SCNC: 103 MMOL/L — SIGNIFICANT CHANGE UP (ref 96–108)
CO2 SERPL-SCNC: 23 MMOL/L — SIGNIFICANT CHANGE UP (ref 22–29)
CO2 SERPL-SCNC: 23 MMOL/L — SIGNIFICANT CHANGE UP (ref 22–29)
CREAT SERPL-MCNC: 0.83 MG/DL — SIGNIFICANT CHANGE UP (ref 0.5–1.3)
CREAT SERPL-MCNC: 0.83 MG/DL — SIGNIFICANT CHANGE UP (ref 0.5–1.3)
EGFR: 69 ML/MIN/1.73M2 — SIGNIFICANT CHANGE UP
EGFR: 69 ML/MIN/1.73M2 — SIGNIFICANT CHANGE UP
GLUCOSE SERPL-MCNC: 98 MG/DL — SIGNIFICANT CHANGE UP (ref 70–99)
GLUCOSE SERPL-MCNC: 98 MG/DL — SIGNIFICANT CHANGE UP (ref 70–99)
HCT VFR BLD CALC: 43.9 % — SIGNIFICANT CHANGE UP (ref 34.5–45)
HCT VFR BLD CALC: 43.9 % — SIGNIFICANT CHANGE UP (ref 34.5–45)
HGB BLD-MCNC: 14.2 G/DL — SIGNIFICANT CHANGE UP (ref 11.5–15.5)
HGB BLD-MCNC: 14.2 G/DL — SIGNIFICANT CHANGE UP (ref 11.5–15.5)
MAGNESIUM SERPL-MCNC: 2 MG/DL — SIGNIFICANT CHANGE UP (ref 1.6–2.6)
MAGNESIUM SERPL-MCNC: 2 MG/DL — SIGNIFICANT CHANGE UP (ref 1.6–2.6)
MCHC RBC-ENTMCNC: 30.7 PG — SIGNIFICANT CHANGE UP (ref 27–34)
MCHC RBC-ENTMCNC: 30.7 PG — SIGNIFICANT CHANGE UP (ref 27–34)
MCHC RBC-ENTMCNC: 32.3 GM/DL — SIGNIFICANT CHANGE UP (ref 32–36)
MCHC RBC-ENTMCNC: 32.3 GM/DL — SIGNIFICANT CHANGE UP (ref 32–36)
MCV RBC AUTO: 94.8 FL — SIGNIFICANT CHANGE UP (ref 80–100)
MCV RBC AUTO: 94.8 FL — SIGNIFICANT CHANGE UP (ref 80–100)
PLATELET # BLD AUTO: 179 K/UL — SIGNIFICANT CHANGE UP (ref 150–400)
PLATELET # BLD AUTO: 179 K/UL — SIGNIFICANT CHANGE UP (ref 150–400)
POTASSIUM SERPL-MCNC: 4.4 MMOL/L — SIGNIFICANT CHANGE UP (ref 3.5–5.3)
POTASSIUM SERPL-MCNC: 4.4 MMOL/L — SIGNIFICANT CHANGE UP (ref 3.5–5.3)
POTASSIUM SERPL-SCNC: 4.4 MMOL/L — SIGNIFICANT CHANGE UP (ref 3.5–5.3)
POTASSIUM SERPL-SCNC: 4.4 MMOL/L — SIGNIFICANT CHANGE UP (ref 3.5–5.3)
PROT SERPL-MCNC: 6.2 G/DL — LOW (ref 6.6–8.7)
PROT SERPL-MCNC: 6.2 G/DL — LOW (ref 6.6–8.7)
RBC # BLD: 4.63 M/UL — SIGNIFICANT CHANGE UP (ref 3.8–5.2)
RBC # BLD: 4.63 M/UL — SIGNIFICANT CHANGE UP (ref 3.8–5.2)
RBC # FLD: 14 % — SIGNIFICANT CHANGE UP (ref 10.3–14.5)
RBC # FLD: 14 % — SIGNIFICANT CHANGE UP (ref 10.3–14.5)
SODIUM SERPL-SCNC: 139 MMOL/L — SIGNIFICANT CHANGE UP (ref 135–145)
SODIUM SERPL-SCNC: 139 MMOL/L — SIGNIFICANT CHANGE UP (ref 135–145)
WBC # BLD: 9.01 K/UL — SIGNIFICANT CHANGE UP (ref 3.8–10.5)
WBC # BLD: 9.01 K/UL — SIGNIFICANT CHANGE UP (ref 3.8–10.5)
WBC # FLD AUTO: 9.01 K/UL — SIGNIFICANT CHANGE UP (ref 3.8–10.5)
WBC # FLD AUTO: 9.01 K/UL — SIGNIFICANT CHANGE UP (ref 3.8–10.5)

## 2023-10-25 PROCEDURE — 93010 ELECTROCARDIOGRAM REPORT: CPT

## 2023-10-25 PROCEDURE — 93306 TTE W/DOPPLER COMPLETE: CPT | Mod: 26

## 2023-10-25 RX ADMIN — Medication 75 MICROGRAM(S): at 06:04

## 2023-10-25 RX ADMIN — APIXABAN 5 MILLIGRAM(S): 2.5 TABLET, FILM COATED ORAL at 10:29

## 2023-10-25 RX ADMIN — Medication 20 MILLIGRAM(S): at 06:04

## 2023-10-25 RX ADMIN — Medication 100 MILLIGRAM(S): at 06:04

## 2023-10-25 RX ADMIN — Medication 20 MILLIEQUIVALENT(S): at 06:04

## 2023-10-25 NOTE — PROGRESS NOTE ADULT - ASSESSMENT
obtain TTE to better assess VHD and ventricular function.    A/P: 89yo female with h/o HTN, HLD, hypothyroid, COPD, CAD/MI with prior PCI to LAD/LCx, AFib on Eliquis with prior RFA 2022, PPM, HFrEF with LVEF previously 45%-now improved to 55-60%, PAD, prior COVID, followed by Dr Beltre for rectal bleeding, endorsing progressive ESPINOZA and fatigue, NST with anterolateral ischemia, now s/p R/LHC via RFA/V yesterday with Dr. Herrera: PA mean 43mmHg, PA sat 51%, PCW 25mmHg, patent intervention sites LAD/LCx, LV gram suggestive of possible significant MR;  post procedure c/b moderate bleeding, manual compression X 10min with achievement of hemostasis, hydralazine 5mg IV X 1 for SBP 190mmHg with improvement to SBP 150mmHg.  TTE today to better assess VHD and ventricular function.  Of note, desat to 84-87% while asleep, O2 2L N/C placed with sat 97% (has home O2 for COPD), no respiratory distress.    -Patient stable for discharge to home today  -Obtain TTE prior to D/C  -Continue current medical therapy, Eliquis restarted this am  -Plan for change to diuretic regimen with Dr. Herrera in outpatient setting  -Educated regarding post procedure management and care  -Discussed the importance of RF modification  -Follow-up outpt in 1-2 weeks with Cardiologist Dr. Herrera  -Has f/u with pulmonologist next week  -Consider sleep study for likely VALENTE out-pt  -Encouraged utilization of home O2 especially HS  -Discussed with Dr. Herrera

## 2023-10-25 NOTE — PROGRESS NOTE ADULT - SUBJECTIVE AND OBJECTIVE BOX
Interventional Cardiology NP note:         EKG:  TELE:    MEDICATIONS  (STANDING):  apixaban 5 milliGRAM(s) Oral <User Schedule>  atorvastatin 40 milliGRAM(s) Oral at bedtime  chlorhexidine 4% Liquid 1 Application(s) Topical Once  citalopram 10 milliGRAM(s) Oral daily  levothyroxine 75 MICROGram(s) Oral daily  melatonin 10 milliGRAM(s) Oral at bedtime  metoprolol tartrate 100 milliGRAM(s) Oral every 12 hours  potassium chloride    Tablet ER 20 milliEquivalent(s) Oral two times a day  torsemide 20 milliGRAM(s) Oral daily    MEDICATIONS  (PRN):      Allergies    No Known Allergies    Intolerances      PAST MEDICAL & SURGICAL HISTORY:  Hypertension      Hyperlipidemia      CAD, multiple vessel      Atrial fibrillation, chronic      COPD (chronic obstructive pulmonary disease)      PAD (peripheral artery disease)      Carotid artery disease      Abnormal stress test      Stented coronary artery  20090a d 2015      H/O shoulder surgery  right shoulder  2015      S/P angioplasty with stent  2009, 2015          Vital Signs Last 24 Hrs  T(C): 36.6 (25 Oct 2023 06:00), Max: 36.8 (24 Oct 2023 14:40)  T(F): 97.8 (25 Oct 2023 06:00), Max: 98.2 (24 Oct 2023 14:40)  HR: 69 (25 Oct 2023 06:00) (69 - 73)  BP: 147/63 (25 Oct 2023 06:00) (115/59 - 181/84)  BP(mean): --  RR: 15 (25 Oct 2023 06:00) (15 - 19)  SpO2: 95% (25 Oct 2023 06:00) (93% - 98%)    Parameters below as of 25 Oct 2023 06:00  Patient On (Oxygen Delivery Method): nasal cannula  O2 Flow (L/min): 2      Physical Exam:  Constitutional: NAD, AAOx3  Cardiovascular: +S1S2 RRR  Pulmonary: CTA b/l, unlabored  GI: soft NTND +BS  Extremities: no pedal edema, +distal pulses b/l  Neuro: non focal, VEGA x4    LABS:                        14.2   9.01  )-----------( 179      ( 25 Oct 2023 03:30 )             43.9     10-25    139  |  103  |  20.1<H>  ----------------------------<  98  4.4   |  23.0  |  0.83    Ca    9.2      25 Oct 2023 03:30  Mg     2.0     10-25    TPro  6.2<L>  /  Alb  3.8  /  TBili  0.6  /  DBili  x   /  AST  21  /  ALT  12  /  AlkPhos  52  10-25      Urinalysis Basic - ( 25 Oct 2023 03:30 )    Color: x / Appearance: x / SG: x / pH: x  Gluc: 98 mg/dL / Ketone: x  / Bili: x / Urobili: x   Blood: x / Protein: x / Nitrite: x   Leuk Esterase: x / RBC: x / WBC x   Sq Epi: x / Non Sq Epi: x / Bacteria: x        RADIOLOGY & ADDITIONAL TESTS:   Interventional Cardiology NP note:     -s/p R/LHC via RFA/V yesterday with Dr. Herrera: PA mean 43mmHg, PA sat 51%, PCW 25mmHg, patent intervention sites LAD/LCx, LV gram suggestive of possible significant MR     TELE: paced     MEDICATIONS  (STANDING):possible significant MRapixaban 5 milliGRAM(s) Oral <User Schedule>  atorvastatin 40 milliGRAM(s) Oral at bedtime  chlorhexidine 4% Liquid 1 Application(s) Topical Once  citalopram 10 milliGRAM(s) Oral daily  levothyroxine 75 MICROGram(s) Oral daily  melatonin 10 milliGRAM(s) Oral at bedtime  metoprolol tartrate 100 milliGRAM(s) Oral every 12 hours  potassium chloride    Tablet ER 20 milliEquivalent(s) Oral two times a day  torsemide 20 milliGRAM(s) Oral daily    MEDICATIONS  (PRN):      Allergies    No Known Allergies    Intolerances      PAST MEDICAL & SURGICAL HISTORY:  Hypertension      Hyperlipidemia      CAD, multiple vessel      Atrial fibrillation, chronic      COPD (chronic obstructive pulmonary disease)      PAD (peripheral artery disease)      Carotid artery disease      Abnormal stress test      Stented coronary artery  20090a d 2015      H/O shoulder surgery  right shoulder  2015      S/P angioplasty with stent  2009, 2015          Vital Signs Last 24 Hrs  T(C): 36.6 (25 Oct 2023 06:00), Max: 36.8 (24 Oct 2023 14:40)  T(F): 97.8 (25 Oct 2023 06:00), Max: 98.2 (24 Oct 2023 14:40)  HR: 69 (25 Oct 2023 06:00) (69 - 73)  BP: 147/63 (25 Oct 2023 06:00) (115/59 - 181/84)  BP(mean): --  RR: 15 (25 Oct 2023 06:00) (15 - 19)  SpO2: 95% (25 Oct 2023 06:00) (93% - 98%)    Parameters below as of 25 Oct 2023 06:00  Patient On (Oxygen Delivery Method): nasal cannula  O2 Flow (L/min): 2      Physical Exam:  Constitutional: NAD, AAOx3  Cardiovascular: +S1S2 RRR  Pulmonary: CTA b/l, unlabored  GI: soft NTND +BS  Extremities: no pedal edema, +distal pulses b/l  Neuro: non focal, VEGA x4    LABS:                        14.2   9.01  )-----------( 179      ( 25 Oct 2023 03:30 )             43.9     10-25    139  |  103  |  20.1<H>  ----------------------------<  98  4.4   |  23.0  |  0.83    Ca    9.2      25 Oct 2023 03:30  Mg     2.0     10-25    TPro  6.2<L>  /  Alb  3.8  /  TBili  0.6  /  DBili  x   /  AST  21  /  ALT  12  /  AlkPhos  52  10-25      Urinalysis Basic - ( 25 Oct 2023 03:30 )    Color: x / Appearance: x / SG: x / pH: x  Gluc: 98 mg/dL / Ketone: x  / Bili: x / Urobili: x   Blood: x / Protein: x / Nitrite: x   Leuk Esterase: x / RBC: x / WBC x   Sq Epi: x / Non Sq Epi: x / Bacteria: x        RADIOLOGY & ADDITIONAL TESTS:   Interventional Cardiology NP note:     -s/p R/LHC via RFA/V yesterday with Dr. Herrera: PA mean 43mmHg, PA sat 51%, PCW 25mmHg, patent intervention sites LAD/LCx, LV gram suggestive of possible significant MR; post procedure c/b moderate bleeding, manual compression X 10min with achievement of hemostasis, hydralazine 5mg IV X 1 for SBP 190mmHg with improvement to SBP 150mmHg      TELE: paced     MEDICATIONS  (STANDING):possible significant MRapixaban 5 milliGRAM(s) Oral <User Schedule>  atorvastatin 40 milliGRAM(s) Oral at bedtime  chlorhexidine 4% Liquid 1 Application(s) Topical Once  citalopram 10 milliGRAM(s) Oral daily  levothyroxine 75 MICROGram(s) Oral daily  melatonin 10 milliGRAM(s) Oral at bedtime  metoprolol tartrate 100 milliGRAM(s) Oral every 12 hours  potassium chloride    Tablet ER 20 milliEquivalent(s) Oral two times a day  torsemide 20 milliGRAM(s) Oral daily      Allergies:  No Known Allergies      PAST MEDICAL & SURGICAL HISTORY:  Hypertension      Hyperlipidemia      CAD, multiple vessel      Atrial fibrillation, chronic      COPD (chronic obstructive pulmonary disease)      PAD (peripheral artery disease)      Carotid artery disease      Abnormal stress test      Stented coronary artery   d       H/O shoulder surgery  right shoulder        S/P angioplasty with stent  2015          Vital Signs Last 24 Hrs  T(C): 36.6 (25 Oct 2023 06:00), Max: 36.8 (24 Oct 2023 14:40)  T(F): 97.8 (25 Oct 2023 06:00), Max: 98.2 (24 Oct 2023 14:40)  HR: 69 (25 Oct 2023 06:00) (69 - 73)  BP: 147/63 (25 Oct 2023 06:00) (115/59 - 181/84)  BP(mean): --  RR: 15 (25 Oct 2023 06:00) (15 - 19)  SpO2: 95% (25 Oct 2023 06:00) (93% - 98%)    Parameters below as of 25 Oct 2023 06:00  Patient On (Oxygen Delivery Method): nasal cannula  O2 Flow (L/min): 2      Physical Exam:  Constitutional: NAD, AAOx3  Cardiovascular: +S1S2 RRR  Pulmonary: CTA b/l, unlabored  GI: soft NTND +BS  Extremities: no pedal edema, +distal pulses b/l  Neuro: non focal, VEGA x4  Procedure site: RFA/V sites benign without hematoma/bleeding; no bruit; + ecchymosis 2x2cm extending medially slightly toward pelvic region; + right PP    LABS:                        14.2   9.01  )-----------( 179      ( 25 Oct 2023 03:30 )             43.9     10-    139  |  103  |  20.1<H>  ----------------------------<  98  4.4   |  23.0  |  0.83    Ca    9.2      25 Oct 2023 03:30  Mg     2.0     10-25    TPro  6.2<L>  /  Alb  3.8  /  TBili  0.6  /  DBili  x   /  AST  21  /  ALT  12  /  AlkPhos  52  10-25      Urinalysis Basic - ( 25 Oct 2023 03:30 )    Color: x / Appearance: x / SG: x / pH: x  Gluc: 98 mg/dL / Ketone: x  / Bili: x / Urobili: x   Blood: x / Protein: x / Nitrite: x   Leuk Esterase: x / RBC: x / WBC x   Sq Epi: x / Non Sq Epi: x / Bacteria: x        RADIOLOGY & ADDITIONAL TESTS:  < from: Cardiac Catheterization (10.24.23 @ 17:54) >  Study Date:     10/24/2023   Name:           ANUPAM LUDWIG   :            1935   (88 years)   Gender:         female   MR#:            [id number]   [facility]#:           [id number]   Patient Class:  Inpatient     Cath Lab Report    Diagnostic Cardiologist:       Alfonso Herrera MD   Fellow:                        Elieser Ruiz, Fellow   Referring Physician:           Mony Willis MD     Procedures Performed   Procedures:              1.    Arterial Access - Right Femoral     2.    Venous Access - Right Femoral   3.    RHC   4.    Diagnostic Coronary Angiography   5.    Left Heart Cath     Indications:               Dyspnea     Diagnostic Conclusions:     LVEF 45-50%   Moderate to severe Mitral regurgitation   Severe Pulmonary HTN   PCWP= 21   Patent stents in the LAD and dominant LCX   Small nondominant RCA   Recommendations:     Repeat Echo   Diurese   If Mitral regurgitation severe, may consider Mitral clipping     Acute complication:    No complications     Procedure Narrative:  The risks and alternatives of the procedures and conscious sedation  were explained to the patient and informed consent was  obtained. The patient was brought to the cath lab and placed on the  exam table.  Access   Right femoral artery:   Vascularaccess was obtained using modified seldinger technique.    Right femoral vein:   Vascular access was obtained using modified seldinger technique.      Coronary Angiography   Left Coronary System:   A catheter was positioned into the vessel ostia under fluoroscopic  guidance. Angiograms were obtained in multiple views.    Patient: ANUPAM LUDWIG           MRN: [id number]  Study Date: 10/24/2023   05:54 PM      Page 1 of 5          Right Coronary System:   A catheter was positioned into the vessel ostia under fluoroscopic  guidance. Angiograms were obtained in multiple views.    Right Heart Cath   Measurements of pressures were obtained.      Diagnostic Findings:     Coronary Angiography   The coronary circulation is left dominant.      LM   Leftmain artery: The segment is visually normal in size and  structure.    LAD   Proximal left anterior descending: The previously placed stent is a  drug-eluting stent and is patent. Mid left anterior descending:  There is a 20 % in-stent restenosis.      CX   Proximal circumflex: The previously placed stent is a drug-eluting  stent and is patent. Mid circumflex: The previously placed  stent is a drug-eluting stent and is patent. Distal circumflex: The  previously placed stent is a drug-eluting stent and is patent.    RCA   Proximal right coronary artery: The segment is small. There is a 40 %  stenosis.    Left Heart Cath   Left ventricular function was assessed and demonstrates abnormal LV  wall motion. The posterobasal segment is hypokinetic.  Global left ventricular function is borderline normal. Ejection  fraction is 50%. LHC performed: Aortic valve crossed and left  ventricular pressures were obtained.    Valves   Aortic Valve: There is no aortic valve stenosis.    Mitral Valve: The mitral valve exhibits moderate to severe  regurgitation.    History and Risk Factors:   Hypertension:                                  Yes   Prior PCI:                                     Yes   A-Fib or Flutter in past 2 weeks:                Yes     X-Ray:   Diagnostic Flouro time:       12.7 min.                    Exam record  DAP:           67.00 cGycm2  Total Flouro Time:            12.7 min.                    Exam total  DAP:            67.00  cGycm2    Ventriculography Wall Motion Diagram     Patient: ANUPAM LUDWIG           MRN: [id number]  Study Date: 10/24/2023   05:54 PM      Page 2 of 5          (-1) hyperkinesia (0) not evaluated (1) normal (2) hypokinesia (3)  akinesia (4) dyskinesia  Exam Start Time:   05:54 PM   Exam End Time:06:30 PM   Exam Duration:     36 min     < end of copied text >

## 2023-10-25 NOTE — DISCHARGE NOTE NURSING/CASE MANAGEMENT/SOCIAL WORK - PATIENT PORTAL LINK FT
You can access the FollowMyHealth Patient Portal offered by [facility] by registering at the following website: [email or web address]. By joining [facility]’s FollowMyHealth portal, you will also be able to view your health information using other applications (apps) compatible with our system.

## 2023-11-13 PROCEDURE — C1760: CPT

## 2023-11-13 PROCEDURE — 36415 COLL VENOUS BLD VENIPUNCTURE: CPT

## 2023-11-13 PROCEDURE — 80048 BASIC METABOLIC PNL TOTAL CA: CPT

## 2023-11-13 PROCEDURE — 93005 ELECTROCARDIOGRAM TRACING: CPT

## 2023-11-13 PROCEDURE — 93460 R&L HRT ART/VENTRICLE ANGIO: CPT

## 2023-11-13 PROCEDURE — 85027 COMPLETE CBC AUTOMATED: CPT

## 2023-11-13 PROCEDURE — C8929: CPT

## 2023-11-13 PROCEDURE — 83735 ASSAY OF MAGNESIUM: CPT

## 2023-11-13 PROCEDURE — C1889: CPT

## 2023-11-13 PROCEDURE — C1887: CPT

## 2023-11-13 PROCEDURE — 85025 COMPLETE CBC W/AUTO DIFF WBC: CPT

## 2023-11-13 PROCEDURE — C1894: CPT

## 2023-11-13 PROCEDURE — C1769: CPT

## 2023-11-13 PROCEDURE — 80053 COMPREHEN METABOLIC PANEL: CPT

## 2023-12-21 ENCOUNTER — NON-APPOINTMENT (OUTPATIENT)
Age: 88
End: 2023-12-21

## 2024-04-11 ENCOUNTER — APPOINTMENT (OUTPATIENT)
Dept: OTOLARYNGOLOGY | Facility: CLINIC | Age: 89
End: 2024-04-11

## 2024-05-08 ENCOUNTER — NON-APPOINTMENT (OUTPATIENT)
Age: 89
End: 2024-05-08

## 2024-05-09 ENCOUNTER — APPOINTMENT (OUTPATIENT)
Dept: UROLOGY | Facility: CLINIC | Age: 89
End: 2024-05-09
Payer: MEDICARE

## 2024-05-09 VITALS
WEIGHT: 184 LBS | HEART RATE: 111 BPM | BODY MASS INDEX: 33.65 KG/M2 | SYSTOLIC BLOOD PRESSURE: 135 MMHG | DIASTOLIC BLOOD PRESSURE: 97 MMHG

## 2024-05-09 PROCEDURE — 99213 OFFICE O/P EST LOW 20 MIN: CPT

## 2024-05-10 NOTE — ASSESSMENT
[FreeTextEntry1] : Urinary urgency/incontinence: will evaluate with US first  then start solifenacin and f/u in 1 month

## 2024-05-10 NOTE — HISTORY OF PRESENT ILLNESS
[FreeTextEntry1] : 87yo F Afib, heart failure presents for f/u She continues to experience frequency, urgency and occasional incontinence  Denies dysuria or hematuria.  She is also on torsemide for heart failure.

## 2024-06-13 ENCOUNTER — APPOINTMENT (OUTPATIENT)
Dept: UROLOGY | Facility: CLINIC | Age: 89
End: 2024-06-13
Payer: MEDICARE

## 2024-06-13 VITALS
SYSTOLIC BLOOD PRESSURE: 97 MMHG | WEIGHT: 175 LBS | DIASTOLIC BLOOD PRESSURE: 65 MMHG | HEART RATE: 85 BPM | BODY MASS INDEX: 32.01 KG/M2

## 2024-06-13 DIAGNOSIS — R32 UNSPECIFIED URINARY INCONTINENCE: ICD-10-CM

## 2024-06-13 PROCEDURE — 99213 OFFICE O/P EST LOW 20 MIN: CPT

## 2024-06-13 RX ORDER — SOLIFENACIN SUCCINATE 10 MG/1
10 TABLET ORAL
Qty: 90 | Refills: 1 | Status: ACTIVE | COMMUNITY
Start: 2024-05-09 | End: 1900-01-01

## 2024-06-13 NOTE — HISTORY OF PRESENT ILLNESS
[FreeTextEntry1] : 87yo F Afib, heart failure presents for f/u She reports since starting solifenacin her symptoms have improved. Denies any more urge incontinence  Denies dysuria or hematuria.  She is also on torsemide for heart failure.

## 2024-07-03 ENCOUNTER — APPOINTMENT (OUTPATIENT)
Dept: UROLOGY | Facility: CLINIC | Age: 89
End: 2024-07-03
Payer: MEDICARE

## 2024-07-03 DIAGNOSIS — N32.81 OVERACTIVE BLADDER: ICD-10-CM

## 2024-07-03 PROCEDURE — 99442: CPT

## 2024-07-03 RX ORDER — MIRABEGRON 25 MG/1
25 TABLET, EXTENDED RELEASE ORAL
Qty: 30 | Refills: 2 | Status: ACTIVE | COMMUNITY
Start: 2024-07-03 | End: 1900-01-01

## 2024-09-12 ENCOUNTER — APPOINTMENT (OUTPATIENT)
Dept: DERMATOLOGY | Facility: CLINIC | Age: 89
End: 2024-09-12

## 2024-12-12 ENCOUNTER — APPOINTMENT (OUTPATIENT)
Dept: UROLOGY | Facility: CLINIC | Age: 89
End: 2024-12-12

## 2025-02-13 ENCOUNTER — APPOINTMENT (OUTPATIENT)
Dept: UROLOGY | Facility: CLINIC | Age: 89
End: 2025-02-13
Payer: MEDICARE

## 2025-02-13 VITALS
HEART RATE: 73 BPM | SYSTOLIC BLOOD PRESSURE: 121 MMHG | HEIGHT: 62 IN | DIASTOLIC BLOOD PRESSURE: 74 MMHG | BODY MASS INDEX: 32.2 KG/M2 | OXYGEN SATURATION: 89 % | WEIGHT: 175 LBS

## 2025-02-13 DIAGNOSIS — R39.9 UNSPECIFIED SYMPTOMS AND SIGNS INVOLVING THE GENITOURINARY SYSTEM: ICD-10-CM

## 2025-02-13 DIAGNOSIS — N32.81 OVERACTIVE BLADDER: ICD-10-CM

## 2025-02-13 LAB
BILIRUB UR QL STRIP: NORMAL
CLARITY UR: CLEAR
COLLECTION METHOD: NORMAL
GLUCOSE UR-MCNC: NORMAL
HCG UR QL: 0.2 EU/DL
HGB UR QL STRIP.AUTO: NORMAL
KETONES UR-MCNC: NORMAL
LEUKOCYTE ESTERASE UR QL STRIP: NORMAL
NITRITE UR QL STRIP: NORMAL
PH UR STRIP: 5.5
PROT UR STRIP-MCNC: NORMAL
SP GR UR STRIP: 1.01

## 2025-02-13 PROCEDURE — 81003 URINALYSIS AUTO W/O SCOPE: CPT | Mod: QW

## 2025-02-13 PROCEDURE — 99213 OFFICE O/P EST LOW 20 MIN: CPT | Mod: 25

## 2025-02-13 RX ORDER — METHENAMINE HIPPURATE 1 G/1
1 TABLET ORAL
Qty: 90 | Refills: 1 | Status: ACTIVE | COMMUNITY
Start: 2025-02-13 | End: 1900-01-01

## 2025-02-17 LAB — BACTERIA UR CULT: NORMAL

## 2025-04-28 ENCOUNTER — APPOINTMENT (OUTPATIENT)
Dept: UROGYNECOLOGY | Facility: CLINIC | Age: 89
End: 2025-04-28
Payer: MEDICARE

## 2025-04-28 LAB
BILIRUB UR QL STRIP: NEGATIVE
CLARITY UR: CLEAR
COLLECTION METHOD: NORMAL
GLUCOSE UR-MCNC: NEGATIVE
HCG UR QL: 0.2 EU/DL
HGB UR QL STRIP.AUTO: NEGATIVE
KETONES UR-MCNC: NEGATIVE
LEUKOCYTE ESTERASE UR QL STRIP: NEGATIVE
NITRITE UR QL STRIP: NEGATIVE
PH UR STRIP: 5.5
PROT UR STRIP-MCNC: NEGATIVE
SP GR UR STRIP: 1.01

## 2025-04-28 PROCEDURE — 81003 URINALYSIS AUTO W/O SCOPE: CPT | Mod: QW

## 2025-04-28 PROCEDURE — 99459 PELVIC EXAMINATION: CPT

## 2025-04-28 PROCEDURE — 51701 INSERT BLADDER CATHETER: CPT | Mod: 59

## 2025-04-28 PROCEDURE — 99204 OFFICE O/P NEW MOD 45 MIN: CPT | Mod: 25

## 2025-06-10 ENCOUNTER — APPOINTMENT (OUTPATIENT)
Facility: CLINIC | Age: 89
End: 2025-06-10
Payer: MEDICARE

## 2025-06-10 ENCOUNTER — RESULT CHARGE (OUTPATIENT)
Age: 89
End: 2025-06-10

## 2025-06-10 PROCEDURE — 51797 INTRAABDOMINAL PRESSURE TEST: CPT

## 2025-06-10 PROCEDURE — 51741 ELECTRO-UROFLOWMETRY FIRST: CPT

## 2025-06-10 PROCEDURE — 51784 ANAL/URINARY MUSCLE STUDY: CPT

## 2025-06-10 PROCEDURE — 51729 CYSTOMETROGRAM W/VP&UP: CPT

## 2025-06-30 ENCOUNTER — RX RENEWAL (OUTPATIENT)
Age: 89
End: 2025-06-30

## 2025-07-02 RX ORDER — CEPHALEXIN 500 MG/1
500 CAPSULE ORAL
Qty: 10 | Refills: 0 | Status: ACTIVE | COMMUNITY
Start: 2025-07-01 | End: 1900-01-01

## 2025-07-08 ENCOUNTER — APPOINTMENT (OUTPATIENT)
Facility: CLINIC | Age: 89
End: 2025-07-08

## 2025-07-30 NOTE — H&P PST ADULT - IS PATIENT PREGNANT?
Ms. Ga is a 65 y/o F with htn, hld, CAD w/ JUANJOSE mLaD, breast CA s/p LT lumpectomy is now s/p right radical nephrectomy and suture ligation of right renal vein and repair of right renal artery w/ vascular, c/b post op HD instability requiring continued intubation and pressor support. Pt now off pressors and extubated. To be listed.    12/19: on cPAP face-tent  and phenylephrine.  12/20: weaned off pressors and extubated. Listed  12/21: transferred to floor in stable condition last evening, tolerating diet, no N/V, minimal OOB, successful TOV, per nephrology no need for HD today, metoprolol and statin restarted, labs improving  12/22: pt doing well, tolerating diet, no N/V + flatus, PT recs rehab but pt prefer to go home, ambulated without assistance to the BR this AM, good UO    Plan   - F/u AM labs  - will d/w nephrology if IR needs to place permacath   - PT consult recs rehab, pt prefers home  - f/u medicine  - f/u vascular  - DVT prophy, IS, OOB, ambulate     Physical Therapy Visit    Visit Type: Daily Treatment Note  Visit: 2  Referring Provider: Isadora Blackman MD  Medical Diagnosis (from order): M54.50 - Acute bilateral low back pain without sciatica     SUBJECTIVE                                                                                                               Patient arrived late to session.   She notes some increase pain after the evaluation. She notes it did get better since then. She notes her exercises have been helpful.     Pain / Symptoms  - Pain rating (out of 10): Current: 5       OBJECTIVE                                                                                                                                        Treatment     Therapeutic Exercise  Nustep level 3, upper extremity at 8, 5 minutes  - soreness - so stopped     Shoulder rows with red theraband 2x10   Shoulder extensions with red theraband 2x10   Shoulder bilateral external rotation with red theraband 2x10   Bilateral shoulder flexion with towel on the wall 2x10     - Supine Bridge - 10 reps  - Sidelying Thoracic Rotation with Open Book  - 10 bilateral           Manual Therapy   Soft tissue mobilization to left thoracic paraspinals and shoulder blade - light pressure     Skilled input: verbal instruction/cues, demonstration, tactile instruction/cues, posture correction and facilitation    Writer verbally educated and received verbal consent for hand placement, positioning of patient, and techniques to be performed today from patient for clothing adjustments for techniques, hand placement and palpation for techniques, modality application and therapist position for techniques as described above and how they are pertinent to the patient's plan of care.  Home Exercise Program  *above indicates provided as part of home exercise program    Access Code: [email or web address]  URL: [email or web address]  Date: 07/25/2025  Prepared by: Maryellen Ortiz    Exercises  - Supine  Bridge  - 1 x daily - 7 x weekly - 3 sets - 10 reps  - Sidelying Thoracic Rotation with Open Book  - 1 x daily - 7 x weekly - 3 sets - 10 reps      ASSESSMENT                                                                                                            Patient demonstrated good tolerance to treatment this date. Patient demonstrated improvements with manual therapy techniques, along with thoracic stretching. Patient continues to have difficulty with postural strength, postural endurance, thoracic mobility. Continued skilled PT indicated to address functional deficits and pain to return patient to prior level of function    Pain/symptoms after session (out of 10): 4 and 5  Education:   - Results of above outlined education: Verbalizes understanding and Needs reinforcement    PLAN                                                                                                                           Suggestions for next session as indicated: Progress per plan of care; assess strength, assess manual; thoracic extensions, give bands next date            Therapy procedure time and total treatment time can be found documented on the Time Entry flowsheet     no

## 2025-08-06 RX ORDER — CEPHALEXIN 250 MG/1
1 CAPSULE ORAL
Refills: 0 | DISCHARGE
Start: 2025-08-06 | End: 2025-08-10

## 2025-08-12 ENCOUNTER — NON-APPOINTMENT (OUTPATIENT)
Age: 89
End: 2025-08-12

## 2025-08-13 ENCOUNTER — NON-APPOINTMENT (OUTPATIENT)
Age: 89
End: 2025-08-13

## 2025-08-13 ENCOUNTER — APPOINTMENT (OUTPATIENT)
Age: 89
End: 2025-08-13

## 2025-08-13 ENCOUNTER — INPATIENT (INPATIENT)
Facility: HOSPITAL | Age: 89
LOS: 8 days | Discharge: HOME | DRG: 291 | End: 2025-08-22
Attending: STUDENT IN AN ORGANIZED HEALTH CARE EDUCATION/TRAINING PROGRAM | Admitting: HOSPITALIST
Payer: MEDICARE

## 2025-08-13 VITALS
HEART RATE: 79 BPM | SYSTOLIC BLOOD PRESSURE: 94 MMHG | TEMPERATURE: 98 F | DIASTOLIC BLOOD PRESSURE: 51 MMHG | RESPIRATION RATE: 18 BRPM | OXYGEN SATURATION: 92 %

## 2025-08-13 DIAGNOSIS — I50.9 HEART FAILURE, UNSPECIFIED: ICD-10-CM

## 2025-08-13 DIAGNOSIS — Z95.9 PRESENCE OF CARDIAC AND VASCULAR IMPLANT AND GRAFT, UNSPECIFIED: Chronic | ICD-10-CM

## 2025-08-13 DIAGNOSIS — Z98.890 OTHER SPECIFIED POSTPROCEDURAL STATES: Chronic | ICD-10-CM

## 2025-08-13 LAB
ALBUMIN SERPL ELPH-MCNC: 3.1 G/DL — LOW (ref 3.3–5)
ALP SERPL-CCNC: 73 U/L — SIGNIFICANT CHANGE UP (ref 40–120)
ALT FLD-CCNC: 32 U/L — SIGNIFICANT CHANGE UP (ref 12–78)
ANION GAP SERPL CALC-SCNC: 7 MMOL/L — SIGNIFICANT CHANGE UP (ref 5–17)
APPEARANCE UR: CLEAR — SIGNIFICANT CHANGE UP
AST SERPL-CCNC: 20 U/L — SIGNIFICANT CHANGE UP (ref 15–37)
BASOPHILS # BLD AUTO: 0.03 K/UL — SIGNIFICANT CHANGE UP (ref 0–0.2)
BASOPHILS NFR BLD AUTO: 0.4 % — SIGNIFICANT CHANGE UP (ref 0–2)
BILIRUB SERPL-MCNC: 0.9 MG/DL — SIGNIFICANT CHANGE UP (ref 0.2–1.2)
BILIRUB UR QL STRIP: NEGATIVE
BILIRUB UR-MCNC: NEGATIVE — SIGNIFICANT CHANGE UP
BUN SERPL-MCNC: 43 MG/DL — HIGH (ref 7–23)
CALCIUM SERPL-MCNC: 9.4 MG/DL — SIGNIFICANT CHANGE UP (ref 8.5–10.1)
CHLORIDE SERPL-SCNC: 108 MMOL/L — SIGNIFICANT CHANGE UP (ref 96–108)
CLARITY UR: CLEAR
CO2 SERPL-SCNC: 25 MMOL/L — SIGNIFICANT CHANGE UP (ref 22–31)
COLLECTION METHOD: NORMAL
COLOR SPEC: YELLOW — SIGNIFICANT CHANGE UP
CREAT SERPL-MCNC: 1.05 MG/DL — SIGNIFICANT CHANGE UP (ref 0.5–1.3)
DIFF PNL FLD: NEGATIVE — SIGNIFICANT CHANGE UP
EGFR: 51 ML/MIN/1.73M2 — LOW
EGFR: 51 ML/MIN/1.73M2 — LOW
EOSINOPHIL # BLD AUTO: 0.01 K/UL — SIGNIFICANT CHANGE UP (ref 0–0.5)
EOSINOPHIL NFR BLD AUTO: 0.1 % — SIGNIFICANT CHANGE UP (ref 0–6)
GLUCOSE SERPL-MCNC: 107 MG/DL — HIGH (ref 70–99)
GLUCOSE UR QL: NEGATIVE MG/DL — SIGNIFICANT CHANGE UP
GLUCOSE UR-MCNC: NEGATIVE
HCG UR QL: 0.2 EU/DL
HCT VFR BLD CALC: 42.7 % — SIGNIFICANT CHANGE UP (ref 34.5–45)
HGB BLD-MCNC: 13.6 G/DL — SIGNIFICANT CHANGE UP (ref 11.5–15.5)
HGB UR QL STRIP.AUTO: NEGATIVE
IMM GRANULOCYTES # BLD AUTO: 0.1 K/UL — HIGH (ref 0–0.07)
IMM GRANULOCYTES NFR BLD AUTO: 1.2 % — HIGH (ref 0–0.9)
KETONES UR QL: NEGATIVE MG/DL — SIGNIFICANT CHANGE UP
KETONES UR-MCNC: NEGATIVE
LEUKOCYTE ESTERASE UR QL STRIP: NORMAL
LEUKOCYTE ESTERASE UR-ACNC: NEGATIVE — SIGNIFICANT CHANGE UP
LYMPHOCYTES # BLD AUTO: 1.74 K/UL — SIGNIFICANT CHANGE UP (ref 1–3.3)
LYMPHOCYTES NFR BLD AUTO: 21.1 % — SIGNIFICANT CHANGE UP (ref 13–44)
MAGNESIUM SERPL-MCNC: 2.3 MG/DL — SIGNIFICANT CHANGE UP (ref 1.6–2.6)
MCHC RBC-ENTMCNC: 31.9 G/DL — LOW (ref 32–36)
MCHC RBC-ENTMCNC: 32.5 PG — SIGNIFICANT CHANGE UP (ref 27–34)
MCV RBC AUTO: 102.2 FL — HIGH (ref 80–100)
MONOCYTES # BLD AUTO: 0.88 K/UL — SIGNIFICANT CHANGE UP (ref 0–0.9)
MONOCYTES NFR BLD AUTO: 10.7 % — SIGNIFICANT CHANGE UP (ref 2–14)
NEUTROPHILS # BLD AUTO: 5.49 K/UL — SIGNIFICANT CHANGE UP (ref 1.8–7.4)
NEUTROPHILS NFR BLD AUTO: 66.5 % — SIGNIFICANT CHANGE UP (ref 43–77)
NITRITE UR QL STRIP: NEGATIVE
NITRITE UR-MCNC: NEGATIVE — SIGNIFICANT CHANGE UP
NRBC # BLD AUTO: 0.07 K/UL — HIGH (ref 0–0)
NRBC # FLD: 0.07 K/UL — HIGH (ref 0–0)
NRBC BLD AUTO-RTO: 0 /100 WBCS — SIGNIFICANT CHANGE UP (ref 0–0)
NT-PROBNP SERPL-SCNC: 8501 PG/ML — HIGH (ref 0–450)
PH UR STRIP: 5.5
PH UR: 5.5 — SIGNIFICANT CHANGE UP (ref 5–8)
PLATELET # BLD AUTO: 201 K/UL — SIGNIFICANT CHANGE UP (ref 150–400)
PMV BLD: 11.2 FL — SIGNIFICANT CHANGE UP (ref 7–13)
POTASSIUM SERPL-MCNC: 4.2 MMOL/L — SIGNIFICANT CHANGE UP (ref 3.5–5.3)
POTASSIUM SERPL-SCNC: 4.2 MMOL/L — SIGNIFICANT CHANGE UP (ref 3.5–5.3)
PROT SERPL-MCNC: 6.1 GM/DL — SIGNIFICANT CHANGE UP (ref 6–8.3)
PROT UR STRIP-MCNC: NORMAL
PROT UR-MCNC: NEGATIVE MG/DL — SIGNIFICANT CHANGE UP
RBC # BLD: 4.18 M/UL — SIGNIFICANT CHANGE UP (ref 3.8–5.2)
RBC # FLD: 17.6 % — HIGH (ref 10.3–14.5)
SODIUM SERPL-SCNC: 140 MMOL/L — SIGNIFICANT CHANGE UP (ref 135–145)
SP GR SPEC: 1.01 — SIGNIFICANT CHANGE UP (ref 1–1.03)
SP GR UR STRIP: 1.01
TROPONIN I, HIGH SENSITIVITY RESULT: 18.51 NG/L — SIGNIFICANT CHANGE UP
UROBILINOGEN FLD QL: 0.2 MG/DL — SIGNIFICANT CHANGE UP (ref 0.2–1)
WBC # BLD: 8.25 K/UL — SIGNIFICANT CHANGE UP (ref 3.8–10.5)
WBC # FLD AUTO: 8.25 K/UL — SIGNIFICANT CHANGE UP (ref 3.8–10.5)

## 2025-08-13 PROCEDURE — 99285 EMERGENCY DEPT VISIT HI MDM: CPT

## 2025-08-13 PROCEDURE — 71045 X-RAY EXAM CHEST 1 VIEW: CPT | Mod: 26

## 2025-08-13 RX ORDER — FUROSEMIDE 10 MG/ML
20 INJECTION INTRAMUSCULAR; INTRAVENOUS ONCE
Refills: 0 | Status: COMPLETED | OUTPATIENT
Start: 2025-08-13 | End: 2025-08-13

## 2025-08-13 RX ADMIN — FUROSEMIDE 20 MILLIGRAM(S): 10 INJECTION INTRAMUSCULAR; INTRAVENOUS at 19:06

## 2025-08-14 ENCOUNTER — RESULT REVIEW (OUTPATIENT)
Age: 89
End: 2025-08-14

## 2025-08-14 LAB
A1C WITH ESTIMATED AVERAGE GLUCOSE RESULT: 6.8 % — HIGH (ref 4–5.6)
ANION GAP SERPL CALC-SCNC: 5 MMOL/L — SIGNIFICANT CHANGE UP (ref 5–17)
BUN SERPL-MCNC: 34 MG/DL — HIGH (ref 7–23)
CALCIUM SERPL-MCNC: 9.2 MG/DL — SIGNIFICANT CHANGE UP (ref 8.5–10.1)
CHLORIDE SERPL-SCNC: 110 MMOL/L — HIGH (ref 96–108)
CHOLEST SERPL-MCNC: 86 MG/DL — SIGNIFICANT CHANGE UP
CO2 SERPL-SCNC: 27 MMOL/L — SIGNIFICANT CHANGE UP (ref 22–31)
CREAT SERPL-MCNC: 0.84 MG/DL — SIGNIFICANT CHANGE UP (ref 0.5–1.3)
EGFR: 67 ML/MIN/1.73M2 — SIGNIFICANT CHANGE UP
EGFR: 67 ML/MIN/1.73M2 — SIGNIFICANT CHANGE UP
ESTIMATED AVERAGE GLUCOSE: 148 MG/DL — HIGH (ref 68–114)
GLUCOSE SERPL-MCNC: 109 MG/DL — HIGH (ref 70–99)
HDLC SERPL-MCNC: 46 MG/DL — LOW
LDLC SERPL-MCNC: 23 MG/DL — SIGNIFICANT CHANGE UP
LIPID PNL WITH DIRECT LDL SERPL: 23 MG/DL — SIGNIFICANT CHANGE UP
NONHDLC SERPL-MCNC: 41 MG/DL — SIGNIFICANT CHANGE UP
POTASSIUM SERPL-MCNC: 3.8 MMOL/L — SIGNIFICANT CHANGE UP (ref 3.5–5.3)
POTASSIUM SERPL-SCNC: 3.8 MMOL/L — SIGNIFICANT CHANGE UP (ref 3.5–5.3)
SODIUM SERPL-SCNC: 142 MMOL/L — SIGNIFICANT CHANGE UP (ref 135–145)
TRIGL SERPL-MCNC: 85 MG/DL — SIGNIFICANT CHANGE UP

## 2025-08-14 PROCEDURE — 85027 COMPLETE CBC AUTOMATED: CPT

## 2025-08-14 PROCEDURE — 93306 TTE W/DOPPLER COMPLETE: CPT | Mod: 26

## 2025-08-14 PROCEDURE — 82607 VITAMIN B-12: CPT

## 2025-08-14 PROCEDURE — 97166 OT EVAL MOD COMPLEX 45 MIN: CPT | Mod: GO

## 2025-08-14 PROCEDURE — 97530 THERAPEUTIC ACTIVITIES: CPT | Mod: GO

## 2025-08-14 PROCEDURE — 36415 COLL VENOUS BLD VENIPUNCTURE: CPT

## 2025-08-14 PROCEDURE — 94640 AIRWAY INHALATION TREATMENT: CPT

## 2025-08-14 PROCEDURE — 97129 THER IVNTJ 1ST 15 MIN: CPT | Mod: GO

## 2025-08-14 PROCEDURE — 84100 ASSAY OF PHOSPHORUS: CPT

## 2025-08-14 PROCEDURE — 80048 BASIC METABOLIC PNL TOTAL CA: CPT

## 2025-08-14 PROCEDURE — 92610 EVALUATE SWALLOWING FUNCTION: CPT | Mod: GN

## 2025-08-14 PROCEDURE — 97116 GAIT TRAINING THERAPY: CPT | Mod: GP

## 2025-08-14 PROCEDURE — 92523 SPEECH SOUND LANG COMPREHEN: CPT | Mod: GN

## 2025-08-14 PROCEDURE — 83735 ASSAY OF MAGNESIUM: CPT

## 2025-08-14 PROCEDURE — 82746 ASSAY OF FOLIC ACID SERUM: CPT

## 2025-08-14 PROCEDURE — 97535 SELF CARE MNGMENT TRAINING: CPT | Mod: GO

## 2025-08-14 PROCEDURE — 83880 ASSAY OF NATRIURETIC PEPTIDE: CPT

## 2025-08-14 RX ORDER — METHENAMINE MANDELATE 1 G
1 TABLET ORAL
Refills: 0 | DISCHARGE

## 2025-08-14 RX ORDER — CITALOPRAM 20 MG/1
10 TABLET ORAL DAILY
Refills: 0 | Status: DISCONTINUED | OUTPATIENT
Start: 2025-08-14 | End: 2025-08-22

## 2025-08-14 RX ORDER — ALBUTEROL SULFATE 2.5 MG/3ML
2 VIAL, NEBULIZER (ML) INHALATION
Refills: 0 | DISCHARGE

## 2025-08-14 RX ORDER — METOPROLOL SUCCINATE 50 MG/1
50 TABLET, EXTENDED RELEASE ORAL
Refills: 0 | Status: DISCONTINUED | OUTPATIENT
Start: 2025-08-14 | End: 2025-08-22

## 2025-08-14 RX ORDER — TORSEMIDE 10 MG
20 TABLET ORAL DAILY
Refills: 0 | Status: DISCONTINUED | OUTPATIENT
Start: 2025-08-14 | End: 2025-08-15

## 2025-08-14 RX ORDER — TORSEMIDE 10 MG
40 TABLET ORAL DAILY
Refills: 0 | Status: DISCONTINUED | OUTPATIENT
Start: 2025-08-14 | End: 2025-08-14

## 2025-08-14 RX ORDER — LEVOTHYROXINE SODIUM 300 MCG
50 TABLET ORAL DAILY
Refills: 0 | Status: DISCONTINUED | OUTPATIENT
Start: 2025-08-14 | End: 2025-08-22

## 2025-08-14 RX ORDER — ALBUTEROL SULFATE 2.5 MG/3ML
2 VIAL, NEBULIZER (ML) INHALATION EVERY 6 HOURS
Refills: 0 | Status: DISCONTINUED | OUTPATIENT
Start: 2025-08-14 | End: 2025-08-22

## 2025-08-14 RX ORDER — ASPIRIN 325 MG
81 TABLET ORAL DAILY
Refills: 0 | Status: DISCONTINUED | OUTPATIENT
Start: 2025-08-14 | End: 2025-08-22

## 2025-08-14 RX ORDER — METOPROLOL SUCCINATE 50 MG/1
1 TABLET, EXTENDED RELEASE ORAL
Refills: 0 | DISCHARGE

## 2025-08-14 RX ORDER — COLCHICINE 0.6 MG/1
1 TABLET, FILM COATED ORAL
Refills: 0 | DISCHARGE

## 2025-08-14 RX ORDER — CITALOPRAM 20 MG/1
1 TABLET ORAL
Refills: 0 | DISCHARGE

## 2025-08-14 RX ORDER — ROSUVASTATIN CALCIUM 20 MG/1
5 TABLET, FILM COATED ORAL AT BEDTIME
Refills: 0 | Status: DISCONTINUED | OUTPATIENT
Start: 2025-08-14 | End: 2025-08-22

## 2025-08-14 RX ORDER — APIXABAN 5 MG/1
5 TABLET, FILM COATED ORAL
Refills: 0 | Status: DISCONTINUED | OUTPATIENT
Start: 2025-08-14 | End: 2025-08-22

## 2025-08-14 RX ORDER — LEVOTHYROXINE SODIUM 300 MCG
1 TABLET ORAL
Refills: 0 | DISCHARGE

## 2025-08-14 RX ORDER — ACETAMINOPHEN 500 MG/5ML
650 LIQUID (ML) ORAL EVERY 6 HOURS
Refills: 0 | Status: DISCONTINUED | OUTPATIENT
Start: 2025-08-14 | End: 2025-08-22

## 2025-08-14 RX ORDER — ASPIRIN 325 MG
300 TABLET ORAL DAILY
Refills: 0 | Status: DISCONTINUED | OUTPATIENT
Start: 2025-08-14 | End: 2025-08-14

## 2025-08-14 RX ORDER — UMECLIDINIUM 62.5 UG/1
1 AEROSOL, POWDER ORAL
Refills: 0 | DISCHARGE

## 2025-08-14 RX ORDER — ROSUVASTATIN CALCIUM 20 MG/1
1 TABLET, FILM COATED ORAL
Refills: 0 | DISCHARGE

## 2025-08-14 RX ADMIN — METOPROLOL SUCCINATE 50 MILLIGRAM(S): 50 TABLET, EXTENDED RELEASE ORAL at 09:26

## 2025-08-14 RX ADMIN — APIXABAN 5 MILLIGRAM(S): 5 TABLET, FILM COATED ORAL at 09:26

## 2025-08-14 RX ADMIN — ROSUVASTATIN CALCIUM 5 MILLIGRAM(S): 20 TABLET, FILM COATED ORAL at 22:24

## 2025-08-14 RX ADMIN — CITALOPRAM 10 MILLIGRAM(S): 20 TABLET ORAL at 09:26

## 2025-08-14 RX ADMIN — METOPROLOL SUCCINATE 50 MILLIGRAM(S): 50 TABLET, EXTENDED RELEASE ORAL at 22:25

## 2025-08-14 RX ADMIN — Medication 650 MILLIGRAM(S): at 14:46

## 2025-08-14 RX ADMIN — Medication 100 MILLIGRAM(S): at 11:15

## 2025-08-14 RX ADMIN — Medication 50 MICROGRAM(S): at 06:25

## 2025-08-14 RX ADMIN — Medication 20 MILLIEQUIVALENT(S): at 11:15

## 2025-08-14 RX ADMIN — APIXABAN 5 MILLIGRAM(S): 5 TABLET, FILM COATED ORAL at 22:25

## 2025-08-14 RX ADMIN — Medication 81 MILLIGRAM(S): at 11:16

## 2025-08-14 RX ADMIN — Medication 20 MILLIGRAM(S): at 12:05

## 2025-08-14 RX ADMIN — Medication 650 MILLIGRAM(S): at 15:15

## 2025-08-14 RX ADMIN — Medication 650 MILLIGRAM(S): at 08:05

## 2025-08-15 DIAGNOSIS — I48.2 CHRONIC ATRIAL FIBRILLATION: ICD-10-CM

## 2025-08-15 DIAGNOSIS — Z95.5 PRESENCE OF CORONARY ANGIOPLASTY IMPLANT AND GRAFT: ICD-10-CM

## 2025-08-15 DIAGNOSIS — I50.43 ACUTE ON CHRONIC COMBINED SYSTOLIC (CONGESTIVE) AND DIASTOLIC (CONGESTIVE) HEART FAILURE: ICD-10-CM

## 2025-08-15 DIAGNOSIS — I27.20 PULMONARY HYPERTENSION, UNSPECIFIED: ICD-10-CM

## 2025-08-15 DIAGNOSIS — I25.10 ATHEROSCLEROTIC HEART DISEASE OF NATIVE CORONARY ARTERY WITHOUT ANGINA PECTORIS: ICD-10-CM

## 2025-08-15 LAB
ANION GAP SERPL CALC-SCNC: 8 MMOL/L — SIGNIFICANT CHANGE UP (ref 5–17)
BUN SERPL-MCNC: 38 MG/DL — HIGH (ref 7–23)
CALCIUM SERPL-MCNC: 9 MG/DL — SIGNIFICANT CHANGE UP (ref 8.5–10.1)
CHLORIDE SERPL-SCNC: 110 MMOL/L — HIGH (ref 96–108)
CO2 SERPL-SCNC: 21 MMOL/L — LOW (ref 22–31)
CREAT SERPL-MCNC: 0.86 MG/DL — SIGNIFICANT CHANGE UP (ref 0.5–1.3)
EGFR: 65 ML/MIN/1.73M2 — SIGNIFICANT CHANGE UP
EGFR: 65 ML/MIN/1.73M2 — SIGNIFICANT CHANGE UP
GLUCOSE SERPL-MCNC: 112 MG/DL — HIGH (ref 70–99)
HCT VFR BLD CALC: 43.3 % — SIGNIFICANT CHANGE UP (ref 34.5–45)
HGB BLD-MCNC: 13.5 G/DL — SIGNIFICANT CHANGE UP (ref 11.5–15.5)
MAGNESIUM SERPL-MCNC: 2 MG/DL — SIGNIFICANT CHANGE UP (ref 1.6–2.6)
MCHC RBC-ENTMCNC: 31.2 G/DL — LOW (ref 32–36)
MCHC RBC-ENTMCNC: 32.2 PG — SIGNIFICANT CHANGE UP (ref 27–34)
MCV RBC AUTO: 103.3 FL — HIGH (ref 80–100)
NRBC # BLD AUTO: 0.04 K/UL — HIGH (ref 0–0)
NRBC # FLD: 0.04 K/UL — HIGH (ref 0–0)
NRBC BLD AUTO-RTO: 0 /100 WBCS — SIGNIFICANT CHANGE UP (ref 0–0)
NT-PROBNP SERPL-SCNC: 8025 PG/ML — HIGH (ref 0–450)
PHOSPHATE SERPL-MCNC: 3.6 MG/DL — SIGNIFICANT CHANGE UP (ref 2.5–4.5)
PLATELET # BLD AUTO: 176 K/UL — SIGNIFICANT CHANGE UP (ref 150–400)
PMV BLD: 11.1 FL — SIGNIFICANT CHANGE UP (ref 7–13)
POTASSIUM SERPL-MCNC: 4.1 MMOL/L — SIGNIFICANT CHANGE UP (ref 3.5–5.3)
POTASSIUM SERPL-SCNC: 4.1 MMOL/L — SIGNIFICANT CHANGE UP (ref 3.5–5.3)
RBC # BLD: 4.19 M/UL — SIGNIFICANT CHANGE UP (ref 3.8–5.2)
RBC # FLD: 17.7 % — HIGH (ref 10.3–14.5)
SODIUM SERPL-SCNC: 139 MMOL/L — SIGNIFICANT CHANGE UP (ref 135–145)
WBC # BLD: 6.64 K/UL — SIGNIFICANT CHANGE UP (ref 3.8–10.5)
WBC # FLD AUTO: 6.64 K/UL — SIGNIFICANT CHANGE UP (ref 3.8–10.5)

## 2025-08-15 PROCEDURE — 99223 1ST HOSP IP/OBS HIGH 75: CPT

## 2025-08-15 PROCEDURE — 99233 SBSQ HOSP IP/OBS HIGH 50: CPT

## 2025-08-15 RX ORDER — FUROSEMIDE 10 MG/ML
40 INJECTION INTRAMUSCULAR; INTRAVENOUS
Refills: 0 | Status: DISCONTINUED | OUTPATIENT
Start: 2025-08-15 | End: 2025-08-19

## 2025-08-15 RX ADMIN — Medication 650 MILLIGRAM(S): at 07:10

## 2025-08-15 RX ADMIN — Medication 650 MILLIGRAM(S): at 18:05

## 2025-08-15 RX ADMIN — Medication 650 MILLIGRAM(S): at 01:00

## 2025-08-15 RX ADMIN — Medication 20 MILLIEQUIVALENT(S): at 09:50

## 2025-08-15 RX ADMIN — Medication 650 MILLIGRAM(S): at 00:54

## 2025-08-15 RX ADMIN — Medication 81 MILLIGRAM(S): at 09:50

## 2025-08-15 RX ADMIN — FUROSEMIDE 40 MILLIGRAM(S): 10 INJECTION INTRAMUSCULAR; INTRAVENOUS at 15:12

## 2025-08-15 RX ADMIN — Medication 100 MILLIGRAM(S): at 09:50

## 2025-08-15 RX ADMIN — METOPROLOL SUCCINATE 50 MILLIGRAM(S): 50 TABLET, EXTENDED RELEASE ORAL at 09:51

## 2025-08-15 RX ADMIN — Medication 50 MICROGRAM(S): at 06:20

## 2025-08-15 RX ADMIN — APIXABAN 5 MILLIGRAM(S): 5 TABLET, FILM COATED ORAL at 09:50

## 2025-08-15 RX ADMIN — CITALOPRAM 10 MILLIGRAM(S): 20 TABLET ORAL at 09:50

## 2025-08-15 RX ADMIN — APIXABAN 5 MILLIGRAM(S): 5 TABLET, FILM COATED ORAL at 21:23

## 2025-08-15 RX ADMIN — Medication 20 MILLIGRAM(S): at 09:51

## 2025-08-15 RX ADMIN — ROSUVASTATIN CALCIUM 5 MILLIGRAM(S): 20 TABLET, FILM COATED ORAL at 21:23

## 2025-08-15 RX ADMIN — METOPROLOL SUCCINATE 50 MILLIGRAM(S): 50 TABLET, EXTENDED RELEASE ORAL at 21:23

## 2025-08-16 LAB
ANION GAP SERPL CALC-SCNC: 8 MMOL/L — SIGNIFICANT CHANGE UP (ref 5–17)
BUN SERPL-MCNC: 30 MG/DL — HIGH (ref 7–23)
CALCIUM SERPL-MCNC: 9.2 MG/DL — SIGNIFICANT CHANGE UP (ref 8.5–10.1)
CHLORIDE SERPL-SCNC: 108 MMOL/L — SIGNIFICANT CHANGE UP (ref 96–108)
CO2 SERPL-SCNC: 24 MMOL/L — SIGNIFICANT CHANGE UP (ref 22–31)
CREAT SERPL-MCNC: 0.86 MG/DL — SIGNIFICANT CHANGE UP (ref 0.5–1.3)
EGFR: 65 ML/MIN/1.73M2 — SIGNIFICANT CHANGE UP
EGFR: 65 ML/MIN/1.73M2 — SIGNIFICANT CHANGE UP
GLUCOSE SERPL-MCNC: 149 MG/DL — HIGH (ref 70–99)
MAGNESIUM SERPL-MCNC: 2 MG/DL — SIGNIFICANT CHANGE UP (ref 1.6–2.6)
POTASSIUM SERPL-MCNC: 3.3 MMOL/L — LOW (ref 3.5–5.3)
POTASSIUM SERPL-SCNC: 3.3 MMOL/L — LOW (ref 3.5–5.3)
SODIUM SERPL-SCNC: 140 MMOL/L — SIGNIFICANT CHANGE UP (ref 135–145)

## 2025-08-16 PROCEDURE — 99233 SBSQ HOSP IP/OBS HIGH 50: CPT

## 2025-08-16 PROCEDURE — 99232 SBSQ HOSP IP/OBS MODERATE 35: CPT

## 2025-08-16 RX ORDER — MAGNESIUM, ALUMINUM HYDROXIDE 200-200 MG
30 TABLET,CHEWABLE ORAL EVERY 6 HOURS
Refills: 0 | Status: DISCONTINUED | OUTPATIENT
Start: 2025-08-16 | End: 2025-08-22

## 2025-08-16 RX ADMIN — Medication 650 MILLIGRAM(S): at 13:39

## 2025-08-16 RX ADMIN — Medication 650 MILLIGRAM(S): at 13:09

## 2025-08-16 RX ADMIN — Medication 30 MILLILITER(S): at 20:39

## 2025-08-16 RX ADMIN — METOPROLOL SUCCINATE 50 MILLIGRAM(S): 50 TABLET, EXTENDED RELEASE ORAL at 09:16

## 2025-08-16 RX ADMIN — Medication 30 MILLILITER(S): at 13:09

## 2025-08-16 RX ADMIN — Medication 50 MICROGRAM(S): at 06:08

## 2025-08-16 RX ADMIN — Medication 40 MILLIEQUIVALENT(S): at 17:02

## 2025-08-16 RX ADMIN — Medication 650 MILLIGRAM(S): at 22:13

## 2025-08-16 RX ADMIN — Medication 100 MILLIGRAM(S): at 09:15

## 2025-08-16 RX ADMIN — ROSUVASTATIN CALCIUM 5 MILLIGRAM(S): 20 TABLET, FILM COATED ORAL at 22:13

## 2025-08-16 RX ADMIN — APIXABAN 5 MILLIGRAM(S): 5 TABLET, FILM COATED ORAL at 22:14

## 2025-08-16 RX ADMIN — APIXABAN 5 MILLIGRAM(S): 5 TABLET, FILM COATED ORAL at 09:16

## 2025-08-16 RX ADMIN — CITALOPRAM 10 MILLIGRAM(S): 20 TABLET ORAL at 09:18

## 2025-08-16 RX ADMIN — Medication 20 MILLIEQUIVALENT(S): at 09:16

## 2025-08-16 RX ADMIN — Medication 40 MILLIEQUIVALENT(S): at 22:16

## 2025-08-16 RX ADMIN — METOPROLOL SUCCINATE 50 MILLIGRAM(S): 50 TABLET, EXTENDED RELEASE ORAL at 22:14

## 2025-08-16 RX ADMIN — FUROSEMIDE 40 MILLIGRAM(S): 10 INJECTION INTRAMUSCULAR; INTRAVENOUS at 13:12

## 2025-08-16 RX ADMIN — Medication 650 MILLIGRAM(S): at 06:12

## 2025-08-16 RX ADMIN — Medication 81 MILLIGRAM(S): at 09:15

## 2025-08-16 RX ADMIN — FUROSEMIDE 40 MILLIGRAM(S): 10 INJECTION INTRAMUSCULAR; INTRAVENOUS at 06:08

## 2025-08-17 LAB
ANION GAP SERPL CALC-SCNC: 9 MMOL/L — SIGNIFICANT CHANGE UP (ref 5–17)
BUN SERPL-MCNC: 31 MG/DL — HIGH (ref 7–23)
CALCIUM SERPL-MCNC: 9.8 MG/DL — SIGNIFICANT CHANGE UP (ref 8.5–10.1)
CHLORIDE SERPL-SCNC: 110 MMOL/L — HIGH (ref 96–108)
CO2 SERPL-SCNC: 21 MMOL/L — LOW (ref 22–31)
CREAT SERPL-MCNC: 0.89 MG/DL — SIGNIFICANT CHANGE UP (ref 0.5–1.3)
EGFR: 62 ML/MIN/1.73M2 — SIGNIFICANT CHANGE UP
EGFR: 62 ML/MIN/1.73M2 — SIGNIFICANT CHANGE UP
GLUCOSE SERPL-MCNC: 163 MG/DL — HIGH (ref 70–99)
MAGNESIUM SERPL-MCNC: 2.6 MG/DL — SIGNIFICANT CHANGE UP (ref 1.6–2.6)
POTASSIUM SERPL-MCNC: 4.9 MMOL/L — SIGNIFICANT CHANGE UP (ref 3.5–5.3)
POTASSIUM SERPL-SCNC: 4.9 MMOL/L — SIGNIFICANT CHANGE UP (ref 3.5–5.3)
SODIUM SERPL-SCNC: 140 MMOL/L — SIGNIFICANT CHANGE UP (ref 135–145)

## 2025-08-17 PROCEDURE — 99233 SBSQ HOSP IP/OBS HIGH 50: CPT

## 2025-08-17 RX ADMIN — ROSUVASTATIN CALCIUM 5 MILLIGRAM(S): 20 TABLET, FILM COATED ORAL at 21:21

## 2025-08-17 RX ADMIN — Medication 50 MICROGRAM(S): at 05:48

## 2025-08-17 RX ADMIN — CITALOPRAM 10 MILLIGRAM(S): 20 TABLET ORAL at 09:45

## 2025-08-17 RX ADMIN — Medication 650 MILLIGRAM(S): at 09:41

## 2025-08-17 RX ADMIN — Medication 100 MILLIGRAM(S): at 09:45

## 2025-08-17 RX ADMIN — METOPROLOL SUCCINATE 50 MILLIGRAM(S): 50 TABLET, EXTENDED RELEASE ORAL at 21:22

## 2025-08-17 RX ADMIN — Medication 650 MILLIGRAM(S): at 06:00

## 2025-08-17 RX ADMIN — APIXABAN 5 MILLIGRAM(S): 5 TABLET, FILM COATED ORAL at 21:21

## 2025-08-17 RX ADMIN — Medication 30 MILLILITER(S): at 15:20

## 2025-08-17 RX ADMIN — METOPROLOL SUCCINATE 50 MILLIGRAM(S): 50 TABLET, EXTENDED RELEASE ORAL at 09:43

## 2025-08-17 RX ADMIN — Medication 20 MILLIEQUIVALENT(S): at 09:43

## 2025-08-17 RX ADMIN — Medication 650 MILLIGRAM(S): at 21:22

## 2025-08-17 RX ADMIN — Medication 650 MILLIGRAM(S): at 21:55

## 2025-08-17 RX ADMIN — Medication 81 MILLIGRAM(S): at 09:44

## 2025-08-17 RX ADMIN — Medication 650 MILLIGRAM(S): at 05:59

## 2025-08-17 RX ADMIN — Medication 650 MILLIGRAM(S): at 10:11

## 2025-08-17 RX ADMIN — FUROSEMIDE 40 MILLIGRAM(S): 10 INJECTION INTRAMUSCULAR; INTRAVENOUS at 05:48

## 2025-08-17 RX ADMIN — APIXABAN 5 MILLIGRAM(S): 5 TABLET, FILM COATED ORAL at 09:43

## 2025-08-17 RX ADMIN — FUROSEMIDE 40 MILLIGRAM(S): 10 INJECTION INTRAMUSCULAR; INTRAVENOUS at 14:18

## 2025-08-18 LAB
ADD ON TEST-SPECIMEN IN LAB: SIGNIFICANT CHANGE UP
ANION GAP SERPL CALC-SCNC: 7 MMOL/L — SIGNIFICANT CHANGE UP (ref 5–17)
BUN SERPL-MCNC: 32 MG/DL — HIGH (ref 7–23)
CALCIUM SERPL-MCNC: 9.8 MG/DL — SIGNIFICANT CHANGE UP (ref 8.5–10.1)
CHLORIDE SERPL-SCNC: 108 MMOL/L — SIGNIFICANT CHANGE UP (ref 96–108)
CO2 SERPL-SCNC: 24 MMOL/L — SIGNIFICANT CHANGE UP (ref 22–31)
CREAT SERPL-MCNC: 0.82 MG/DL — SIGNIFICANT CHANGE UP (ref 0.5–1.3)
EGFR: 69 ML/MIN/1.73M2 — SIGNIFICANT CHANGE UP
EGFR: 69 ML/MIN/1.73M2 — SIGNIFICANT CHANGE UP
GLUCOSE SERPL-MCNC: 132 MG/DL — HIGH (ref 70–99)
MAGNESIUM SERPL-MCNC: 2.4 MG/DL — SIGNIFICANT CHANGE UP (ref 1.6–2.6)
NT-PROBNP SERPL-SCNC: 9447 PG/ML — HIGH (ref 0–450)
POTASSIUM SERPL-MCNC: 4.1 MMOL/L — SIGNIFICANT CHANGE UP (ref 3.5–5.3)
POTASSIUM SERPL-SCNC: 4.1 MMOL/L — SIGNIFICANT CHANGE UP (ref 3.5–5.3)
SODIUM SERPL-SCNC: 139 MMOL/L — SIGNIFICANT CHANGE UP (ref 135–145)

## 2025-08-18 PROCEDURE — 99233 SBSQ HOSP IP/OBS HIGH 50: CPT

## 2025-08-18 RX ORDER — TIOTROPIUM BROMIDE INHALATION SPRAY 3.12 UG/1
2 SPRAY, METERED RESPIRATORY (INHALATION) DAILY
Refills: 0 | Status: DISCONTINUED | OUTPATIENT
Start: 2025-08-18 | End: 2025-08-22

## 2025-08-18 RX ADMIN — METOPROLOL SUCCINATE 50 MILLIGRAM(S): 50 TABLET, EXTENDED RELEASE ORAL at 21:14

## 2025-08-18 RX ADMIN — ROSUVASTATIN CALCIUM 5 MILLIGRAM(S): 20 TABLET, FILM COATED ORAL at 21:14

## 2025-08-18 RX ADMIN — Medication 100 MILLIGRAM(S): at 09:32

## 2025-08-18 RX ADMIN — Medication 650 MILLIGRAM(S): at 05:21

## 2025-08-18 RX ADMIN — APIXABAN 5 MILLIGRAM(S): 5 TABLET, FILM COATED ORAL at 09:31

## 2025-08-18 RX ADMIN — TIOTROPIUM BROMIDE INHALATION SPRAY 2 PUFF(S): 3.12 SPRAY, METERED RESPIRATORY (INHALATION) at 17:13

## 2025-08-18 RX ADMIN — Medication 20 MILLIEQUIVALENT(S): at 09:32

## 2025-08-18 RX ADMIN — Medication 650 MILLIGRAM(S): at 21:13

## 2025-08-18 RX ADMIN — METOPROLOL SUCCINATE 50 MILLIGRAM(S): 50 TABLET, EXTENDED RELEASE ORAL at 09:31

## 2025-08-18 RX ADMIN — FUROSEMIDE 40 MILLIGRAM(S): 10 INJECTION INTRAMUSCULAR; INTRAVENOUS at 05:22

## 2025-08-18 RX ADMIN — APIXABAN 5 MILLIGRAM(S): 5 TABLET, FILM COATED ORAL at 21:14

## 2025-08-18 RX ADMIN — FUROSEMIDE 40 MILLIGRAM(S): 10 INJECTION INTRAMUSCULAR; INTRAVENOUS at 17:59

## 2025-08-18 RX ADMIN — Medication 650 MILLIGRAM(S): at 21:55

## 2025-08-18 RX ADMIN — CITALOPRAM 10 MILLIGRAM(S): 20 TABLET ORAL at 09:33

## 2025-08-18 RX ADMIN — Medication 81 MILLIGRAM(S): at 09:30

## 2025-08-18 RX ADMIN — Medication 50 MICROGRAM(S): at 05:22

## 2025-08-19 LAB
ANION GAP SERPL CALC-SCNC: 8 MMOL/L — SIGNIFICANT CHANGE UP (ref 5–17)
BUN SERPL-MCNC: 37 MG/DL — HIGH (ref 7–23)
CALCIUM SERPL-MCNC: 9.3 MG/DL — SIGNIFICANT CHANGE UP (ref 8.5–10.1)
CHLORIDE SERPL-SCNC: 105 MMOL/L — SIGNIFICANT CHANGE UP (ref 96–108)
CO2 SERPL-SCNC: 26 MMOL/L — SIGNIFICANT CHANGE UP (ref 22–31)
CREAT SERPL-MCNC: 0.93 MG/DL — SIGNIFICANT CHANGE UP (ref 0.5–1.3)
EGFR: 59 ML/MIN/1.73M2 — LOW
EGFR: 59 ML/MIN/1.73M2 — LOW
FOLATE SERPL-MCNC: 18.2 NG/ML — SIGNIFICANT CHANGE UP
GLUCOSE SERPL-MCNC: 118 MG/DL — HIGH (ref 70–99)
MAGNESIUM SERPL-MCNC: 2.4 MG/DL — SIGNIFICANT CHANGE UP (ref 1.6–2.6)
POTASSIUM SERPL-MCNC: 3.7 MMOL/L — SIGNIFICANT CHANGE UP (ref 3.5–5.3)
POTASSIUM SERPL-SCNC: 3.7 MMOL/L — SIGNIFICANT CHANGE UP (ref 3.5–5.3)
SODIUM SERPL-SCNC: 139 MMOL/L — SIGNIFICANT CHANGE UP (ref 135–145)
VIT B12 SERPL-MCNC: 1212 PG/ML — SIGNIFICANT CHANGE UP (ref 232–1245)

## 2025-08-19 PROCEDURE — 99233 SBSQ HOSP IP/OBS HIGH 50: CPT

## 2025-08-19 PROCEDURE — 99232 SBSQ HOSP IP/OBS MODERATE 35: CPT

## 2025-08-19 RX ORDER — FUROSEMIDE 10 MG/ML
40 INJECTION INTRAMUSCULAR; INTRAVENOUS DAILY
Refills: 0 | Status: DISCONTINUED | OUTPATIENT
Start: 2025-08-19 | End: 2025-08-22

## 2025-08-19 RX ORDER — CYCLOBENZAPRINE HYDROCHLORIDE 15 MG/1
5 CAPSULE, EXTENDED RELEASE ORAL ONCE
Refills: 0 | Status: COMPLETED | OUTPATIENT
Start: 2025-08-19 | End: 2025-08-19

## 2025-08-19 RX ORDER — LIDOCAINE HYDROCHLORIDE 20 MG/ML
1 JELLY TOPICAL ONCE
Refills: 0 | Status: COMPLETED | OUTPATIENT
Start: 2025-08-19 | End: 2025-08-19

## 2025-08-19 RX ADMIN — TIOTROPIUM BROMIDE INHALATION SPRAY 2 PUFF(S): 3.12 SPRAY, METERED RESPIRATORY (INHALATION) at 09:31

## 2025-08-19 RX ADMIN — LIDOCAINE HYDROCHLORIDE 1 PATCH: 20 JELLY TOPICAL at 08:00

## 2025-08-19 RX ADMIN — LIDOCAINE HYDROCHLORIDE 1 PATCH: 20 JELLY TOPICAL at 12:42

## 2025-08-19 RX ADMIN — Medication 81 MILLIGRAM(S): at 09:57

## 2025-08-19 RX ADMIN — Medication 50 MICROGRAM(S): at 05:15

## 2025-08-19 RX ADMIN — Medication 650 MILLIGRAM(S): at 10:28

## 2025-08-19 RX ADMIN — FUROSEMIDE 40 MILLIGRAM(S): 10 INJECTION INTRAMUSCULAR; INTRAVENOUS at 05:15

## 2025-08-19 RX ADMIN — CITALOPRAM 10 MILLIGRAM(S): 20 TABLET ORAL at 09:57

## 2025-08-19 RX ADMIN — Medication 20 MILLIEQUIVALENT(S): at 09:57

## 2025-08-19 RX ADMIN — Medication 650 MILLIGRAM(S): at 09:58

## 2025-08-19 RX ADMIN — METOPROLOL SUCCINATE 50 MILLIGRAM(S): 50 TABLET, EXTENDED RELEASE ORAL at 22:35

## 2025-08-19 RX ADMIN — METOPROLOL SUCCINATE 50 MILLIGRAM(S): 50 TABLET, EXTENDED RELEASE ORAL at 09:57

## 2025-08-19 RX ADMIN — Medication 100 MILLIGRAM(S): at 09:57

## 2025-08-19 RX ADMIN — FUROSEMIDE 40 MILLIGRAM(S): 10 INJECTION INTRAMUSCULAR; INTRAVENOUS at 22:36

## 2025-08-19 RX ADMIN — APIXABAN 5 MILLIGRAM(S): 5 TABLET, FILM COATED ORAL at 22:36

## 2025-08-19 RX ADMIN — APIXABAN 5 MILLIGRAM(S): 5 TABLET, FILM COATED ORAL at 09:57

## 2025-08-19 RX ADMIN — ROSUVASTATIN CALCIUM 5 MILLIGRAM(S): 20 TABLET, FILM COATED ORAL at 22:35

## 2025-08-19 RX ADMIN — LIDOCAINE HYDROCHLORIDE 1 PATCH: 20 JELLY TOPICAL at 00:42

## 2025-08-20 PROCEDURE — 99232 SBSQ HOSP IP/OBS MODERATE 35: CPT

## 2025-08-20 RX ADMIN — APIXABAN 5 MILLIGRAM(S): 5 TABLET, FILM COATED ORAL at 21:05

## 2025-08-20 RX ADMIN — Medication 650 MILLIGRAM(S): at 21:04

## 2025-08-20 RX ADMIN — Medication 650 MILLIGRAM(S): at 08:49

## 2025-08-20 RX ADMIN — Medication 650 MILLIGRAM(S): at 01:05

## 2025-08-20 RX ADMIN — CITALOPRAM 10 MILLIGRAM(S): 20 TABLET ORAL at 08:52

## 2025-08-20 RX ADMIN — Medication 81 MILLIGRAM(S): at 08:50

## 2025-08-20 RX ADMIN — METOPROLOL SUCCINATE 50 MILLIGRAM(S): 50 TABLET, EXTENDED RELEASE ORAL at 08:51

## 2025-08-20 RX ADMIN — FUROSEMIDE 40 MILLIGRAM(S): 10 INJECTION INTRAMUSCULAR; INTRAVENOUS at 08:51

## 2025-08-20 RX ADMIN — Medication 20 MILLIEQUIVALENT(S): at 08:50

## 2025-08-20 RX ADMIN — ROSUVASTATIN CALCIUM 5 MILLIGRAM(S): 20 TABLET, FILM COATED ORAL at 21:04

## 2025-08-20 RX ADMIN — Medication 100 MILLIGRAM(S): at 08:51

## 2025-08-20 RX ADMIN — METOPROLOL SUCCINATE 50 MILLIGRAM(S): 50 TABLET, EXTENDED RELEASE ORAL at 21:04

## 2025-08-20 RX ADMIN — TIOTROPIUM BROMIDE INHALATION SPRAY 2 PUFF(S): 3.12 SPRAY, METERED RESPIRATORY (INHALATION) at 07:56

## 2025-08-20 RX ADMIN — Medication 650 MILLIGRAM(S): at 09:19

## 2025-08-20 RX ADMIN — Medication 650 MILLIGRAM(S): at 00:20

## 2025-08-20 RX ADMIN — Medication 50 MICROGRAM(S): at 05:35

## 2025-08-20 RX ADMIN — APIXABAN 5 MILLIGRAM(S): 5 TABLET, FILM COATED ORAL at 08:51

## 2025-08-21 PROCEDURE — 99232 SBSQ HOSP IP/OBS MODERATE 35: CPT

## 2025-08-21 RX ORDER — MELATONIN 5 MG
5 TABLET ORAL AT BEDTIME
Refills: 0 | Status: DISCONTINUED | OUTPATIENT
Start: 2025-08-21 | End: 2025-08-22

## 2025-08-21 RX ADMIN — CITALOPRAM 10 MILLIGRAM(S): 20 TABLET ORAL at 09:56

## 2025-08-21 RX ADMIN — Medication 650 MILLIGRAM(S): at 21:52

## 2025-08-21 RX ADMIN — Medication 650 MILLIGRAM(S): at 22:30

## 2025-08-21 RX ADMIN — METOPROLOL SUCCINATE 50 MILLIGRAM(S): 50 TABLET, EXTENDED RELEASE ORAL at 09:56

## 2025-08-21 RX ADMIN — Medication 20 MILLIEQUIVALENT(S): at 09:57

## 2025-08-21 RX ADMIN — Medication 650 MILLIGRAM(S): at 01:44

## 2025-08-21 RX ADMIN — APIXABAN 5 MILLIGRAM(S): 5 TABLET, FILM COATED ORAL at 21:53

## 2025-08-21 RX ADMIN — Medication 50 MICROGRAM(S): at 05:53

## 2025-08-21 RX ADMIN — Medication 81 MILLIGRAM(S): at 09:56

## 2025-08-21 RX ADMIN — Medication 650 MILLIGRAM(S): at 11:14

## 2025-08-21 RX ADMIN — Medication 5 MILLIGRAM(S): at 23:08

## 2025-08-21 RX ADMIN — Medication 100 MILLIGRAM(S): at 09:56

## 2025-08-21 RX ADMIN — FUROSEMIDE 40 MILLIGRAM(S): 10 INJECTION INTRAMUSCULAR; INTRAVENOUS at 09:57

## 2025-08-21 RX ADMIN — ROSUVASTATIN CALCIUM 5 MILLIGRAM(S): 20 TABLET, FILM COATED ORAL at 21:53

## 2025-08-21 RX ADMIN — APIXABAN 5 MILLIGRAM(S): 5 TABLET, FILM COATED ORAL at 09:57

## 2025-08-21 RX ADMIN — TIOTROPIUM BROMIDE INHALATION SPRAY 2 PUFF(S): 3.12 SPRAY, METERED RESPIRATORY (INHALATION) at 07:27

## 2025-08-22 ENCOUNTER — TRANSCRIPTION ENCOUNTER (OUTPATIENT)
Age: 89
End: 2025-08-22

## 2025-08-22 VITALS
HEART RATE: 70 BPM | DIASTOLIC BLOOD PRESSURE: 76 MMHG | TEMPERATURE: 97 F | OXYGEN SATURATION: 99 % | SYSTOLIC BLOOD PRESSURE: 115 MMHG | RESPIRATION RATE: 18 BRPM

## 2025-08-22 PROCEDURE — 99239 HOSP IP/OBS DSCHRG MGMT >30: CPT

## 2025-08-22 RX ORDER — FUROSEMIDE 10 MG/ML
1 INJECTION INTRAMUSCULAR; INTRAVENOUS
Qty: 0 | Refills: 0 | DISCHARGE
Start: 2025-08-22

## 2025-08-22 RX ORDER — ASPIRIN 325 MG
1 TABLET ORAL
Qty: 0 | Refills: 0 | DISCHARGE
Start: 2025-08-22

## 2025-08-22 RX ADMIN — Medication 650 MILLIGRAM(S): at 06:21

## 2025-08-22 RX ADMIN — TIOTROPIUM BROMIDE INHALATION SPRAY 2 PUFF(S): 3.12 SPRAY, METERED RESPIRATORY (INHALATION) at 09:06

## 2025-08-22 RX ADMIN — Medication 100 MILLIGRAM(S): at 09:12

## 2025-08-22 RX ADMIN — APIXABAN 5 MILLIGRAM(S): 5 TABLET, FILM COATED ORAL at 09:11

## 2025-08-22 RX ADMIN — METOPROLOL SUCCINATE 50 MILLIGRAM(S): 50 TABLET, EXTENDED RELEASE ORAL at 09:10

## 2025-08-22 RX ADMIN — CITALOPRAM 10 MILLIGRAM(S): 20 TABLET ORAL at 09:11

## 2025-08-22 RX ADMIN — FUROSEMIDE 40 MILLIGRAM(S): 10 INJECTION INTRAMUSCULAR; INTRAVENOUS at 09:10

## 2025-08-22 RX ADMIN — Medication 650 MILLIGRAM(S): at 12:25

## 2025-08-22 RX ADMIN — Medication 50 MICROGRAM(S): at 06:21

## 2025-08-22 RX ADMIN — Medication 650 MILLIGRAM(S): at 13:15

## 2025-08-22 RX ADMIN — Medication 20 MILLIEQUIVALENT(S): at 09:12

## 2025-08-22 RX ADMIN — Medication 81 MILLIGRAM(S): at 09:10

## 2025-08-22 RX ADMIN — Medication 650 MILLIGRAM(S): at 07:02

## 2025-08-26 ENCOUNTER — APPOINTMENT (OUTPATIENT)
Age: 89
End: 2025-08-26
Payer: MEDICARE

## 2025-08-26 ENCOUNTER — NON-APPOINTMENT (OUTPATIENT)
Age: 89
End: 2025-08-26

## 2025-08-26 VITALS
DIASTOLIC BLOOD PRESSURE: 64 MMHG | WEIGHT: 175 LBS | BODY MASS INDEX: 32.2 KG/M2 | HEIGHT: 62 IN | SYSTOLIC BLOOD PRESSURE: 114 MMHG

## 2025-08-26 LAB
APPEARANCE: CLEAR
BILIRUBIN URINE: NEGATIVE
BLOOD URINE: ABNORMAL
COLOR: YELLOW
GLUCOSE QUALITATIVE U: NEGATIVE
KETONES URINE: NEGATIVE
LEUKOCYTE ESTERASE URINE: ABNORMAL
NITRITE URINE: NEGATIVE
PH URINE: 5.5
PROTEIN URINE: >=300
SPECIFIC GRAVITY URINE: >=1.03
UROBILINOGEN URINE: 0.2 (ref 0.2–?)

## 2025-08-26 PROCEDURE — 52287 CYSTOSCOPY CHEMODENERVATION: CPT

## 2025-08-28 DIAGNOSIS — I27.20 PULMONARY HYPERTENSION, UNSPECIFIED: ICD-10-CM

## 2025-08-28 DIAGNOSIS — I11.0 HYPERTENSIVE HEART DISEASE WITH HEART FAILURE: ICD-10-CM

## 2025-08-28 DIAGNOSIS — I48.20 CHRONIC ATRIAL FIBRILLATION, UNSPECIFIED: ICD-10-CM

## 2025-08-28 DIAGNOSIS — I25.10 ATHEROSCLEROTIC HEART DISEASE OF NATIVE CORONARY ARTERY WITHOUT ANGINA PECTORIS: ICD-10-CM

## 2025-08-28 DIAGNOSIS — E78.5 HYPERLIPIDEMIA, UNSPECIFIED: ICD-10-CM

## 2025-08-28 DIAGNOSIS — Z87.891 PERSONAL HISTORY OF NICOTINE DEPENDENCE: ICD-10-CM

## 2025-08-28 DIAGNOSIS — Z79.01 LONG TERM (CURRENT) USE OF ANTICOAGULANTS: ICD-10-CM

## 2025-08-28 DIAGNOSIS — I50.43 ACUTE ON CHRONIC COMBINED SYSTOLIC (CONGESTIVE) AND DIASTOLIC (CONGESTIVE) HEART FAILURE: ICD-10-CM

## 2025-08-28 DIAGNOSIS — J96.21 ACUTE AND CHRONIC RESPIRATORY FAILURE WITH HYPOXIA: ICD-10-CM

## 2025-08-28 DIAGNOSIS — R06.02 SHORTNESS OF BREATH: ICD-10-CM

## 2025-08-28 DIAGNOSIS — Z95.5 PRESENCE OF CORONARY ANGIOPLASTY IMPLANT AND GRAFT: ICD-10-CM
